# Patient Record
Sex: MALE | Race: WHITE | Employment: OTHER | URBAN - METROPOLITAN AREA
[De-identification: names, ages, dates, MRNs, and addresses within clinical notes are randomized per-mention and may not be internally consistent; named-entity substitution may affect disease eponyms.]

---

## 2017-01-01 ENCOUNTER — HOSPITAL ENCOUNTER (OUTPATIENT)
Dept: CT IMAGING | Age: 82
Discharge: HOME | End: 2017-01-01
Attending: FAMILY MEDICINE
Payer: MEDICARE

## 2017-01-01 ENCOUNTER — TELEPHONE (OUTPATIENT)
Dept: FAMILY MEDICINE CLINIC | Facility: CLINIC | Age: 82
End: 2017-01-01

## 2017-01-01 ENCOUNTER — RT VISIT (OUTPATIENT)
Dept: RESPIRATORY THERAPY | Facility: HOSPITAL | Age: 82
End: 2017-01-01
Attending: FAMILY MEDICINE
Payer: MEDICARE

## 2017-01-01 ENCOUNTER — HOSPITAL ENCOUNTER (OUTPATIENT)
Dept: MRI IMAGING | Facility: HOSPITAL | Age: 82
Discharge: HOME | End: 2017-01-01
Attending: INTERNAL MEDICINE
Payer: MEDICARE

## 2017-01-01 ENCOUNTER — OFFICE VISIT (OUTPATIENT)
Dept: FAMILY MEDICINE CLINIC | Facility: CLINIC | Age: 82
End: 2017-01-01

## 2017-01-01 ENCOUNTER — HOSPITAL ENCOUNTER (OUTPATIENT)
Facility: HOSPITAL | Age: 82
Setting detail: HOSPITAL OUTPATIENT SURGERY
Discharge: HOME | End: 2017-01-01
Attending: INTERNAL MEDICINE | Admitting: INTERNAL MEDICINE
Payer: MEDICARE

## 2017-01-01 ENCOUNTER — ANESTHESIA EVENT (OUTPATIENT)
Dept: ENDOSCOPY | Facility: HOSPITAL | Age: 82
End: 2017-01-01
Payer: MEDICARE

## 2017-01-01 ENCOUNTER — OFFICE VISIT (OUTPATIENT)
Dept: HEMATOLOGY/ONCOLOGY | Facility: HOSPITAL | Age: 82
End: 2017-01-01
Attending: INTERNAL MEDICINE
Payer: MEDICARE

## 2017-01-01 ENCOUNTER — HOSPITAL ENCOUNTER (OUTPATIENT)
Dept: ULTRASOUND IMAGING | Facility: HOSPITAL | Age: 82
Discharge: HOME | End: 2017-01-01
Attending: FAMILY MEDICINE
Payer: MEDICARE

## 2017-01-01 ENCOUNTER — LAB ENCOUNTER (OUTPATIENT)
Dept: LAB | Age: 82
End: 2017-01-01
Attending: FAMILY MEDICINE
Payer: MEDICARE

## 2017-01-01 ENCOUNTER — PATIENT OUTREACH (OUTPATIENT)
Dept: CASE MANAGEMENT | Age: 82
End: 2017-01-01

## 2017-01-01 ENCOUNTER — MED REC SCAN ONLY (OUTPATIENT)
Dept: FAMILY MEDICINE CLINIC | Facility: CLINIC | Age: 82
End: 2017-01-01

## 2017-01-01 ENCOUNTER — ANESTHESIA (OUTPATIENT)
Dept: ENDOSCOPY | Facility: HOSPITAL | Age: 82
End: 2017-01-01
Payer: MEDICARE

## 2017-01-01 ENCOUNTER — SURGERY (OUTPATIENT)
Age: 82
End: 2017-01-01

## 2017-01-01 ENCOUNTER — HOSPITAL ENCOUNTER (OUTPATIENT)
Dept: NUCLEAR MEDICINE | Facility: HOSPITAL | Age: 82
Discharge: HOME | End: 2017-01-01
Attending: INTERNAL MEDICINE
Payer: MEDICARE

## 2017-01-01 ENCOUNTER — HOSPITAL ENCOUNTER (OUTPATIENT)
Dept: ULTRASOUND IMAGING | Age: 82
Discharge: HOME | End: 2017-01-01
Attending: FAMILY MEDICINE
Payer: MEDICARE

## 2017-01-01 ENCOUNTER — HOSPITAL ENCOUNTER (OUTPATIENT)
Dept: GENERAL RADIOLOGY | Age: 82
Discharge: HOME | End: 2017-01-01
Attending: FAMILY MEDICINE
Payer: MEDICARE

## 2017-01-01 VITALS
TEMPERATURE: 98 F | OXYGEN SATURATION: 89 % | HEIGHT: 64 IN | RESPIRATION RATE: 18 BRPM | SYSTOLIC BLOOD PRESSURE: 148 MMHG | HEART RATE: 37 BPM | BODY MASS INDEX: 34.15 KG/M2 | WEIGHT: 200 LBS | DIASTOLIC BLOOD PRESSURE: 75 MMHG

## 2017-01-01 VITALS
TEMPERATURE: 98 F | RESPIRATION RATE: 20 BRPM | SYSTOLIC BLOOD PRESSURE: 126 MMHG | OXYGEN SATURATION: 98 % | BODY MASS INDEX: 34.49 KG/M2 | HEART RATE: 68 BPM | DIASTOLIC BLOOD PRESSURE: 72 MMHG | HEIGHT: 64 IN | WEIGHT: 202 LBS

## 2017-01-01 VITALS
SYSTOLIC BLOOD PRESSURE: 152 MMHG | DIASTOLIC BLOOD PRESSURE: 67 MMHG | WEIGHT: 203.63 LBS | HEIGHT: 64 IN | RESPIRATION RATE: 20 BRPM | HEART RATE: 77 BPM | BODY MASS INDEX: 34.76 KG/M2 | OXYGEN SATURATION: 94 %

## 2017-01-01 VITALS
BODY MASS INDEX: 34.49 KG/M2 | DIASTOLIC BLOOD PRESSURE: 58 MMHG | HEART RATE: 78 BPM | SYSTOLIC BLOOD PRESSURE: 130 MMHG | OXYGEN SATURATION: 96 % | WEIGHT: 202 LBS | HEIGHT: 64 IN | TEMPERATURE: 98 F | RESPIRATION RATE: 20 BRPM

## 2017-01-01 DIAGNOSIS — R06.00 DYSPNEA, UNSPECIFIED TYPE: ICD-10-CM

## 2017-01-01 DIAGNOSIS — E04.1 THYROID NODULE: ICD-10-CM

## 2017-01-01 DIAGNOSIS — I10 ESSENTIAL HYPERTENSION: ICD-10-CM

## 2017-01-01 DIAGNOSIS — C34.92 SMALL CELL CARCINOMA OF LEFT LUNG (HCC): Primary | ICD-10-CM

## 2017-01-01 DIAGNOSIS — R91.8 LUNG MASS: ICD-10-CM

## 2017-01-01 DIAGNOSIS — R91.1 LUNG NODULE: ICD-10-CM

## 2017-01-01 DIAGNOSIS — R63.5 WEIGHT GAIN: ICD-10-CM

## 2017-01-01 DIAGNOSIS — C34.92 SMALL CELL CARCINOMA OF LEFT LUNG (HCC): ICD-10-CM

## 2017-01-01 DIAGNOSIS — E04.1 THYROID NODULE: Primary | ICD-10-CM

## 2017-01-01 DIAGNOSIS — I10 BENIGN ESSENTIAL HTN: ICD-10-CM

## 2017-01-01 DIAGNOSIS — E78.5 DYSLIPIDEMIA: ICD-10-CM

## 2017-01-01 DIAGNOSIS — C34.90 SMALL CELL CARCINOMA OF LUNG, UNSPECIFIED LATERALITY: ICD-10-CM

## 2017-01-01 DIAGNOSIS — N18.30 CKD (CHRONIC KIDNEY DISEASE), STAGE III (HCC): ICD-10-CM

## 2017-01-01 DIAGNOSIS — R63.5 WEIGHT GAIN: Primary | ICD-10-CM

## 2017-01-01 DIAGNOSIS — E11.9 TYPE 2 DIABETES MELLITUS WITHOUT COMPLICATION, WITHOUT LONG-TERM CURRENT USE OF INSULIN (HCC): ICD-10-CM

## 2017-01-01 DIAGNOSIS — R91.8 LUNG MASS: Primary | ICD-10-CM

## 2017-01-01 DIAGNOSIS — C61 MALIGNANT NEOPLASM OF PROSTATE (HCC): ICD-10-CM

## 2017-01-01 DIAGNOSIS — I70.0 AORTIC ATHEROSCLEROSIS (HCC): ICD-10-CM

## 2017-01-01 PROCEDURE — 99205 OFFICE O/P NEW HI 60 MIN: CPT | Performed by: INTERNAL MEDICINE

## 2017-01-01 PROCEDURE — 94729 DIFFUSING CAPACITY: CPT

## 2017-01-01 PROCEDURE — 94060 EVALUATION OF WHEEZING: CPT

## 2017-01-01 PROCEDURE — 88173 CYTOPATH EVAL FNA REPORT: CPT | Performed by: FAMILY MEDICINE

## 2017-01-01 PROCEDURE — 94726 PLETHYSMOGRAPHY LUNG VOLUMES: CPT

## 2017-01-01 PROCEDURE — 99214 OFFICE O/P EST MOD 30 MIN: CPT | Performed by: FAMILY MEDICINE

## 2017-01-01 PROCEDURE — 93005 ELECTROCARDIOGRAM TRACING: CPT

## 2017-01-01 PROCEDURE — 07978ZX DRAINAGE OF THORAX LYMPHATIC, VIA NATURAL OR ARTIFICIAL OPENING ENDOSCOPIC APPROACH, DIAGNOSTIC: ICD-10-PCS | Performed by: INTERNAL MEDICINE

## 2017-01-01 PROCEDURE — 0B9H8ZX DRAINAGE OF LUNG LINGULA, VIA NATURAL OR ARTIFICIAL OPENING ENDOSCOPIC, DIAGNOSTIC: ICD-10-PCS | Performed by: INTERNAL MEDICINE

## 2017-01-01 PROCEDURE — 84481 FREE ASSAY (FT-3): CPT

## 2017-01-01 PROCEDURE — 36415 COLL VENOUS BLD VENIPUNCTURE: CPT

## 2017-01-01 PROCEDURE — 93010 ELECTROCARDIOGRAM REPORT: CPT | Performed by: INTERNAL MEDICINE

## 2017-01-01 PROCEDURE — 80053 COMPREHEN METABOLIC PANEL: CPT

## 2017-01-01 PROCEDURE — 10022 US FNA THYROID (CPT=10022/76942): CPT | Performed by: FAMILY MEDICINE

## 2017-01-01 PROCEDURE — 84443 ASSAY THYROID STIM HORMONE: CPT

## 2017-01-01 PROCEDURE — A9575 INJ GADOTERATE MEGLUMI 0.1ML: HCPCS | Performed by: INTERNAL MEDICINE

## 2017-01-01 PROCEDURE — 88341 IMHCHEM/IMCYTCHM EA ADD ANTB: CPT

## 2017-01-01 PROCEDURE — 76942 ECHO GUIDE FOR BIOPSY: CPT | Performed by: FAMILY MEDICINE

## 2017-01-01 PROCEDURE — 88342 IMHCHEM/IMCYTCHM 1ST ANTB: CPT

## 2017-01-01 PROCEDURE — 82962 GLUCOSE BLOOD TEST: CPT

## 2017-01-01 PROCEDURE — 78815 PET IMAGE W/CT SKULL-THIGH: CPT | Performed by: INTERNAL MEDICINE

## 2017-01-01 PROCEDURE — 88173 CYTOPATH EVAL FNA REPORT: CPT

## 2017-01-01 PROCEDURE — 84439 ASSAY OF FREE THYROXINE: CPT

## 2017-01-01 PROCEDURE — 71250 CT THORAX DX C-: CPT | Performed by: FAMILY MEDICINE

## 2017-01-01 PROCEDURE — 71020 XR CHEST PA + LAT CHEST (CPT=71020): CPT | Performed by: FAMILY MEDICINE

## 2017-01-01 PROCEDURE — 76536 US EXAM OF HEAD AND NECK: CPT | Performed by: FAMILY MEDICINE

## 2017-01-01 PROCEDURE — 83036 HEMOGLOBIN GLYCOSYLATED A1C: CPT

## 2017-01-01 PROCEDURE — 99490 CHRNC CARE MGMT STAFF 1ST 20: CPT

## 2017-01-01 PROCEDURE — 88305 TISSUE EXAM BY PATHOLOGIST: CPT

## 2017-01-01 PROCEDURE — 88104 CYTOPATH FL NONGYN SMEARS: CPT

## 2017-01-01 PROCEDURE — 70553 MRI BRAIN STEM W/O & W/DYE: CPT | Performed by: INTERNAL MEDICINE

## 2017-01-01 PROCEDURE — 88172 CYTP DX EVAL FNA 1ST EA SITE: CPT

## 2017-01-01 RX ORDER — MIDAZOLAM HYDROCHLORIDE 1 MG/ML
1 INJECTION INTRAMUSCULAR; INTRAVENOUS EVERY 5 MIN PRN
Status: DISCONTINUED | OUTPATIENT
Start: 2017-01-01 | End: 2017-01-01 | Stop reason: HOSPADM

## 2017-01-01 RX ORDER — DIPHENHYDRAMINE HYDROCHLORIDE 50 MG/ML
12.5 INJECTION INTRAMUSCULAR; INTRAVENOUS AS NEEDED
Status: DISCONTINUED | OUTPATIENT
Start: 2017-01-01 | End: 2017-01-01 | Stop reason: HOSPADM

## 2017-01-01 RX ORDER — HYDROMORPHONE HYDROCHLORIDE 1 MG/ML
0.5 INJECTION, SOLUTION INTRAMUSCULAR; INTRAVENOUS; SUBCUTANEOUS EVERY 5 MIN PRN
Status: DISCONTINUED | OUTPATIENT
Start: 2017-01-01 | End: 2017-01-01 | Stop reason: HOSPADM

## 2017-01-01 RX ORDER — ONDANSETRON 2 MG/ML
4 INJECTION INTRAMUSCULAR; INTRAVENOUS AS NEEDED
Status: DISCONTINUED | OUTPATIENT
Start: 2017-01-01 | End: 2017-01-01 | Stop reason: HOSPADM

## 2017-01-01 RX ORDER — AMLODIPINE BESYLATE 5 MG/1
TABLET ORAL
Qty: 90 TABLET | Refills: 3 | Status: SHIPPED | OUTPATIENT
Start: 2017-01-01 | End: 2018-01-01

## 2017-01-01 RX ORDER — METOCLOPRAMIDE HYDROCHLORIDE 5 MG/ML
10 INJECTION INTRAMUSCULAR; INTRAVENOUS AS NEEDED
Status: DISCONTINUED | OUTPATIENT
Start: 2017-01-01 | End: 2017-01-01 | Stop reason: HOSPADM

## 2017-01-01 RX ORDER — DEXTROSE MONOHYDRATE 25 G/50ML
50 INJECTION, SOLUTION INTRAVENOUS
Status: DISCONTINUED | OUTPATIENT
Start: 2017-01-01 | End: 2017-01-01 | Stop reason: HOSPADM

## 2017-01-01 RX ORDER — ALBUTEROL SULFATE 2.5 MG/3ML
2.5 SOLUTION RESPIRATORY (INHALATION) AS NEEDED
Status: DISCONTINUED | OUTPATIENT
Start: 2017-01-01 | End: 2017-01-01 | Stop reason: HOSPADM

## 2017-01-01 RX ORDER — HYDROCODONE BITARTRATE AND ACETAMINOPHEN 5; 325 MG/1; MG/1
1 TABLET ORAL AS NEEDED
Status: DISCONTINUED | OUTPATIENT
Start: 2017-01-01 | End: 2017-01-01 | Stop reason: HOSPADM

## 2017-01-01 RX ORDER — ALPRAZOLAM 0.25 MG/1
0.25 TABLET ORAL ONCE
Qty: 2 TABLET | Refills: 0 | Status: SHIPPED | OUTPATIENT
Start: 2017-01-01 | End: 2017-01-01

## 2017-01-01 RX ORDER — MEPERIDINE HYDROCHLORIDE 25 MG/ML
12.5 INJECTION INTRAMUSCULAR; INTRAVENOUS; SUBCUTANEOUS AS NEEDED
Status: DISCONTINUED | OUTPATIENT
Start: 2017-01-01 | End: 2017-01-01 | Stop reason: HOSPADM

## 2017-01-01 RX ORDER — ONDANSETRON 2 MG/ML
INJECTION INTRAMUSCULAR; INTRAVENOUS
Status: COMPLETED
Start: 2017-01-01 | End: 2017-01-01

## 2017-01-01 RX ORDER — HYDROMORPHONE HYDROCHLORIDE 1 MG/ML
INJECTION, SOLUTION INTRAMUSCULAR; INTRAVENOUS; SUBCUTANEOUS
Status: COMPLETED
Start: 2017-01-01 | End: 2017-01-01

## 2017-01-01 RX ORDER — SODIUM CHLORIDE, SODIUM LACTATE, POTASSIUM CHLORIDE, CALCIUM CHLORIDE 600; 310; 30; 20 MG/100ML; MG/100ML; MG/100ML; MG/100ML
INJECTION, SOLUTION INTRAVENOUS CONTINUOUS
Status: DISCONTINUED | OUTPATIENT
Start: 2017-01-01 | End: 2017-01-01

## 2017-01-01 RX ORDER — SODIUM CHLORIDE, SODIUM LACTATE, POTASSIUM CHLORIDE, CALCIUM CHLORIDE 600; 310; 30; 20 MG/100ML; MG/100ML; MG/100ML; MG/100ML
INJECTION, SOLUTION INTRAVENOUS CONTINUOUS
Status: DISCONTINUED | OUTPATIENT
Start: 2017-01-01 | End: 2017-01-01 | Stop reason: HOSPADM

## 2017-01-01 RX ORDER — NALOXONE HYDROCHLORIDE 0.4 MG/ML
80 INJECTION, SOLUTION INTRAMUSCULAR; INTRAVENOUS; SUBCUTANEOUS AS NEEDED
Status: DISCONTINUED | OUTPATIENT
Start: 2017-01-01 | End: 2017-01-01 | Stop reason: HOSPADM

## 2017-01-01 RX ORDER — HYDROCODONE BITARTRATE AND ACETAMINOPHEN 5; 325 MG/1; MG/1
2 TABLET ORAL AS NEEDED
Status: DISCONTINUED | OUTPATIENT
Start: 2017-01-01 | End: 2017-01-01 | Stop reason: HOSPADM

## 2017-01-01 RX ORDER — IBUPROFEN 200 MG
TABLET ORAL
COMMUNITY
Start: 2017-11-12 | End: 2018-01-01

## 2017-01-03 RX ORDER — ALLOPURINOL 300 MG/1
TABLET ORAL
Qty: 30 TABLET | Refills: 0 | Status: SHIPPED | OUTPATIENT
Start: 2017-01-03 | End: 2017-01-25

## 2017-01-03 NOTE — TELEPHONE ENCOUNTER
Requesting Allopurinol. Filled 12/1/16 #30 with 0 refills. LOV: 10/26/16 RTC after seeing Pulmonology.

## 2017-01-19 RX ORDER — QUINAPRIL HCL AND HYDROCHLOROTHIAZIDE 10; 12.5 MG/1; MG/1
1 TABLET ORAL DAILY
COMMUNITY
End: 2017-01-25

## 2017-01-25 ENCOUNTER — TELEPHONE (OUTPATIENT)
Dept: FAMILY MEDICINE CLINIC | Facility: CLINIC | Age: 82
End: 2017-01-25

## 2017-01-25 ENCOUNTER — OFFICE VISIT (OUTPATIENT)
Dept: FAMILY MEDICINE CLINIC | Facility: CLINIC | Age: 82
End: 2017-01-25

## 2017-01-25 VITALS
RESPIRATION RATE: 16 BRPM | HEIGHT: 64 IN | SYSTOLIC BLOOD PRESSURE: 152 MMHG | HEART RATE: 78 BPM | DIASTOLIC BLOOD PRESSURE: 88 MMHG | WEIGHT: 175 LBS | BODY MASS INDEX: 29.88 KG/M2 | TEMPERATURE: 98 F

## 2017-01-25 DIAGNOSIS — M1A.9XX0 CHRONIC GOUT WITHOUT TOPHUS, UNSPECIFIED CAUSE, UNSPECIFIED SITE: ICD-10-CM

## 2017-01-25 DIAGNOSIS — E11.9 TYPE 2 DIABETES MELLITUS WITHOUT COMPLICATION, WITHOUT LONG-TERM CURRENT USE OF INSULIN (HCC): Primary | ICD-10-CM

## 2017-01-25 DIAGNOSIS — L29.3 ITCHING OF MALE GENITALIA: ICD-10-CM

## 2017-01-25 DIAGNOSIS — C61 MALIGNANT NEOPLASM OF PROSTATE (HCC): ICD-10-CM

## 2017-01-25 DIAGNOSIS — I10 ESSENTIAL HYPERTENSION: ICD-10-CM

## 2017-01-25 DIAGNOSIS — J31.0 CHRONIC RHINITIS: ICD-10-CM

## 2017-01-25 PROCEDURE — 99214 OFFICE O/P EST MOD 30 MIN: CPT | Performed by: FAMILY MEDICINE

## 2017-01-25 RX ORDER — AZELASTINE 1 MG/ML
1 SPRAY, METERED NASAL 2 TIMES DAILY
Qty: 1 BOTTLE | Refills: 5 | Status: SHIPPED | OUTPATIENT
Start: 2017-01-25 | End: 2018-01-01

## 2017-01-25 RX ORDER — COLCHICINE 0.6 MG/1
0.6 TABLET ORAL DAILY
Qty: 90 TABLET | Refills: 1 | Status: SHIPPED | OUTPATIENT
Start: 2017-01-25 | End: 2017-09-06 | Stop reason: ALTCHOICE

## 2017-01-25 RX ORDER — QUINAPRIL HCL AND HYDROCHLOROTHIAZIDE 20; 12.5 MG/1; MG/1
1 TABLET ORAL DAILY
Qty: 90 TABLET | Refills: 1 | Status: SHIPPED | OUTPATIENT
Start: 2017-01-25 | End: 2017-04-25

## 2017-01-25 RX ORDER — ALLOPURINOL 300 MG/1
TABLET ORAL
Qty: 90 TABLET | Refills: 1 | Status: ON HOLD | OUTPATIENT
Start: 2017-01-25 | End: 2017-04-11

## 2017-01-25 NOTE — PROGRESS NOTES
HPI:    Patient ID: Patrica Apley is a 80year old male. HPI Comments: Hx of prostate cancer. Considered to be in remission. Has not had PSA done in > 1 yr. No new symptoms. No bone pain. No unexpected weight loss.     HTN  This is a chronic pro Gets it in the toes b/l worse on left side. Redness, swelling, and tenderness. . Nothing aggravates the symptoms. Treatments tried: allopurinol, colchicine. The treatment provided mild relief. Rhinorrhea  This is a chronic problem.  The current episode st mouth daily. Disp: 30 tablet Rfl: 6   MetFORMIN HCl 500 MG Oral Tab Take 500 mg by mouth 2 (two) times daily with meals. Disp:  Rfl:    cetirizine 10 MG Oral Tab Take 10 mg by mouth daily as needed.    Disp:  Rfl:    omeprazole 20 MG Oral Capsule Delayed Re Soft. Bowel sounds are normal. He exhibits no distension. There is no tenderness. Genitourinary: Penis normal. No penile tenderness. Genital area with red rash on groin area lke eczema. Lymphadenopathy:     He has no cervical adenopathy.    Skin: He i Dispense: 60 g; Refill: 3    6. Malignant neoplasm of prostate ([address]  - PSA (Screening) [E];  Future      Orders Placed This Encounter  Hemoglobin A1C [E]  Uric Acid, Serum [E]  PSA (Screening) [E]  Microalb/Creat Ratio, Random Urine [E]    Meds This Visit:

## 2017-01-26 ENCOUNTER — LAB ENCOUNTER (OUTPATIENT)
Dept: LAB | Age: 82
End: 2017-01-26
Payer: MEDICARE

## 2017-01-26 ENCOUNTER — APPOINTMENT (OUTPATIENT)
Dept: LAB | Age: 82
End: 2017-01-26
Payer: MEDICARE

## 2017-01-26 DIAGNOSIS — C61 MALIGNANT NEOPLASM OF PROSTATE (HCC): ICD-10-CM

## 2017-01-26 DIAGNOSIS — M1A.9XX0 CHRONIC GOUT WITHOUT TOPHUS, UNSPECIFIED CAUSE, UNSPECIFIED SITE: ICD-10-CM

## 2017-01-26 DIAGNOSIS — Z86.010 HISTORY OF COLON POLYPS: ICD-10-CM

## 2017-01-26 DIAGNOSIS — K21.9 ESOPHAGEAL REFLUX: ICD-10-CM

## 2017-01-26 DIAGNOSIS — E11.9 TYPE 2 DIABETES MELLITUS WITHOUT COMPLICATION, WITHOUT LONG-TERM CURRENT USE OF INSULIN (HCC): ICD-10-CM

## 2017-01-26 DIAGNOSIS — I10 ESSENTIAL HYPERTENSION: ICD-10-CM

## 2017-01-26 LAB
ALBUMIN SERPL-MCNC: 3.5 G/DL (ref 3.5–4.8)
ALP LIVER SERPL-CCNC: 91 U/L (ref 45–117)
ALT SERPL-CCNC: 22 U/L (ref 17–63)
AST SERPL-CCNC: 23 U/L (ref 15–41)
ATRIAL RATE: 65 BPM
ATRIAL RATE: 66 BPM
BASOPHILS # BLD AUTO: 0.03 X10(3) UL (ref 0–0.1)
BASOPHILS NFR BLD AUTO: 0.4 %
BILIRUB SERPL-MCNC: 0.3 MG/DL (ref 0.1–2)
BUN BLD-MCNC: 26 MG/DL (ref 8–20)
CALCIUM BLD-MCNC: 9.6 MG/DL (ref 8.3–10.3)
CHLORIDE: 103 MMOL/L (ref 101–111)
CHOLEST SMN-MCNC: 144 MG/DL (ref ?–200)
CO2: 27 MMOL/L (ref 22–32)
COMPLEXED PSA SERPL-MCNC: 0.66 NG/ML (ref 0.01–4)
CREAT BLD-MCNC: 1.26 MG/DL (ref 0.7–1.3)
CREAT UR-SCNC: 151 MG/DL
EOSINOPHIL # BLD AUTO: 0.2 X10(3) UL (ref 0–0.3)
EOSINOPHIL NFR BLD AUTO: 2.8 %
ERYTHROCYTE [DISTWIDTH] IN BLOOD BY AUTOMATED COUNT: 14.6 % (ref 11.5–16)
EST. AVERAGE GLUCOSE BLD GHB EST-MCNC: 126 MG/DL (ref 68–126)
GLUCOSE BLD-MCNC: 108 MG/DL (ref 70–99)
HBA1C MFR BLD HPLC: 6 % (ref ?–5.7)
HCT VFR BLD AUTO: 38.5 % (ref 37–53)
HDLC SERPL-MCNC: 47 MG/DL (ref 45–?)
HDLC SERPL: 3.06 {RATIO} (ref ?–4.97)
HGB BLD-MCNC: 12.5 G/DL (ref 13–17)
IMMATURE GRANULOCYTE COUNT: 0.04 X10(3) UL (ref 0–1)
IMMATURE GRANULOCYTE RATIO %: 0.6 %
LDLC SERPL CALC-MCNC: 69 MG/DL (ref ?–130)
LYMPHOCYTES # BLD AUTO: 1.88 X10(3) UL (ref 0.9–4)
LYMPHOCYTES NFR BLD AUTO: 26.1 %
M PROTEIN MFR SERPL ELPH: 7.8 G/DL (ref 6.1–8.3)
MCH RBC QN AUTO: 30.6 PG (ref 27–33.2)
MCHC RBC AUTO-ENTMCNC: 32.5 G/DL (ref 31–37)
MCV RBC AUTO: 94.1 FL (ref 80–99)
MICROALBUMIN UR-MCNC: 162 MG/DL
MICROALBUMIN/CREAT 24H UR-RTO: 1072.8 UG/MG (ref ?–30)
MONOCYTES # BLD AUTO: 0.44 X10(3) UL (ref 0.1–0.6)
MONOCYTES NFR BLD AUTO: 6.1 %
NEUTROPHIL ABS PRELIM: 4.62 X10 (3) UL (ref 1.3–6.7)
NEUTROPHILS # BLD AUTO: 4.62 X10(3) UL (ref 1.3–6.7)
NEUTROPHILS NFR BLD AUTO: 64 %
NONHDLC SERPL-MCNC: 97 MG/DL (ref ?–130)
P AXIS: 32 DEGREES
P AXIS: 38 DEGREES
P-R INTERVAL: 202 MS
P-R INTERVAL: 202 MS
PLATELET # BLD AUTO: 193 10(3)UL (ref 150–450)
POTASSIUM SERPL-SCNC: 4.7 MMOL/L (ref 3.6–5.1)
Q-T INTERVAL: 424 MS
Q-T INTERVAL: 426 MS
QRS DURATION: 100 MS
QRS DURATION: 102 MS
QTC CALCULATION (BEZET): 440 MS
QTC CALCULATION (BEZET): 446 MS
R AXIS: -14 DEGREES
R AXIS: -15 DEGREES
RBC # BLD AUTO: 4.09 X10(6)UL (ref 3.8–5.8)
RED CELL DISTRIBUTION WIDTH-SD: 50.6 FL (ref 35.1–46.3)
SODIUM SERPL-SCNC: 139 MMOL/L (ref 136–144)
T AXIS: 28 DEGREES
T AXIS: 38 DEGREES
TRIGLYCERIDES: 141 MG/DL (ref ?–150)
URIC ACID: 4.9 MG/DL (ref 2.4–8.7)
VENTRICULAR RATE: 65 BPM
VENTRICULAR RATE: 66 BPM
VLDL: 28 MG/DL (ref 5–40)
WBC # BLD AUTO: 7.2 X10(3) UL (ref 4–13)

## 2017-01-26 PROCEDURE — 80061 LIPID PANEL: CPT

## 2017-01-26 PROCEDURE — 83036 HEMOGLOBIN GLYCOSYLATED A1C: CPT

## 2017-01-26 PROCEDURE — 84550 ASSAY OF BLOOD/URIC ACID: CPT

## 2017-01-26 PROCEDURE — 93005 ELECTROCARDIOGRAM TRACING: CPT

## 2017-01-26 PROCEDURE — 85025 COMPLETE CBC W/AUTO DIFF WBC: CPT

## 2017-01-26 PROCEDURE — 82043 UR ALBUMIN QUANTITATIVE: CPT

## 2017-01-26 PROCEDURE — 82570 ASSAY OF URINE CREATININE: CPT

## 2017-01-26 PROCEDURE — 93010 ELECTROCARDIOGRAM REPORT: CPT | Performed by: INTERNAL MEDICINE

## 2017-01-26 PROCEDURE — 80053 COMPREHEN METABOLIC PANEL: CPT

## 2017-01-26 PROCEDURE — 36415 COLL VENOUS BLD VENIPUNCTURE: CPT

## 2017-01-27 ENCOUNTER — TELEPHONE (OUTPATIENT)
Dept: FAMILY MEDICINE CLINIC | Facility: CLINIC | Age: 82
End: 2017-01-27

## 2017-02-08 ENCOUNTER — OFFICE VISIT (OUTPATIENT)
Dept: FAMILY MEDICINE CLINIC | Facility: CLINIC | Age: 82
End: 2017-02-08

## 2017-02-08 VITALS
DIASTOLIC BLOOD PRESSURE: 76 MMHG | HEIGHT: 64 IN | BODY MASS INDEX: 32.1 KG/M2 | SYSTOLIC BLOOD PRESSURE: 122 MMHG | WEIGHT: 188 LBS | RESPIRATION RATE: 16 BRPM | HEART RATE: 80 BPM

## 2017-02-08 DIAGNOSIS — M21.612 BUNION OF GREAT TOE OF LEFT FOOT: ICD-10-CM

## 2017-02-08 DIAGNOSIS — B35.6 JOCK ITCH: ICD-10-CM

## 2017-02-08 DIAGNOSIS — E04.9 ENLARGED THYROID: ICD-10-CM

## 2017-02-08 DIAGNOSIS — N18.30 CKD (CHRONIC KIDNEY DISEASE), STAGE III (HCC): ICD-10-CM

## 2017-02-08 DIAGNOSIS — I10 ESSENTIAL HYPERTENSION: Primary | ICD-10-CM

## 2017-02-08 DIAGNOSIS — M1A.9XX0 CHRONIC GOUT INVOLVING TOE OF LEFT FOOT WITHOUT TOPHUS, UNSPECIFIED CAUSE: ICD-10-CM

## 2017-02-08 PROCEDURE — 99214 OFFICE O/P EST MOD 30 MIN: CPT | Performed by: FAMILY MEDICINE

## 2017-02-08 RX ORDER — KETOCONAZOLE 20 MG/G
1 CREAM TOPICAL DAILY
Qty: 60 TUBE | Refills: 1 | Status: SHIPPED | OUTPATIENT
Start: 2017-02-08 | End: 2017-03-27 | Stop reason: ALTCHOICE

## 2017-02-09 NOTE — PROGRESS NOTES
HPI:    Patient ID: Peace Adamson is a 80year old male. HPI Comments: Has hx of enlarged thyroid from imaging in the past and thyroid nodules. HTN  This is a chronic problem. The current episode started more than 1 year ago.  The problem has been are negative. Current Outpatient Prescriptions:  ketoconazole 2 % External Cream Apply 1 Application topically daily. Disp: 60 Tube Rfl: 1   Quinapril-Hydrochlorothiazide 20-12.5 MG Oral Tab Take 1 tablet by mouth daily.  Disp: 90 tablet Rfl: 1 discharge. Left eye exhibits no discharge. No scleral icterus. Neck: Normal range of motion. Neck supple. Thyromegaly present. Cardiovascular: Normal rate, regular rhythm and normal heart sounds.     Pulmonary/Chest: Effort normal and breath sounds norm daily.           Imaging & Referrals:  US THYROID (NRF=[id number])       AJ#[id number]

## 2017-02-10 ENCOUNTER — HOSPITAL ENCOUNTER (OUTPATIENT)
Dept: ULTRASOUND IMAGING | Age: 82
Discharge: HOME | End: 2017-02-10
Attending: FAMILY MEDICINE
Payer: MEDICARE

## 2017-02-10 ENCOUNTER — APPOINTMENT (OUTPATIENT)
Dept: LAB | Age: 82
End: 2017-02-10
Attending: FAMILY MEDICINE
Payer: MEDICARE

## 2017-02-10 DIAGNOSIS — E04.9 ENLARGED THYROID: ICD-10-CM

## 2017-02-10 LAB
FREE T4: 1.1 NG/DL (ref 0.9–1.8)
TSI SER-ACNC: 1.21 MIU/ML (ref 0.35–5.5)

## 2017-02-10 PROCEDURE — 76536 US EXAM OF HEAD AND NECK: CPT

## 2017-02-10 PROCEDURE — 84439 ASSAY OF FREE THYROXINE: CPT

## 2017-02-10 PROCEDURE — 84443 ASSAY THYROID STIM HORMONE: CPT

## 2017-02-10 PROCEDURE — 36415 COLL VENOUS BLD VENIPUNCTURE: CPT

## 2017-02-21 ENCOUNTER — HOSPITAL ENCOUNTER (OUTPATIENT)
Facility: HOSPITAL | Age: 82
Setting detail: HOSPITAL OUTPATIENT SURGERY
Discharge: HOME | End: 2017-02-21
Attending: INTERNAL MEDICINE | Admitting: INTERNAL MEDICINE
Payer: MEDICARE

## 2017-02-21 ENCOUNTER — SURGERY (OUTPATIENT)
Age: 82
End: 2017-02-21

## 2017-02-21 ENCOUNTER — ANESTHESIA (OUTPATIENT)
Dept: ENDOSCOPY | Facility: HOSPITAL | Age: 82
End: 2017-02-21

## 2017-02-21 ENCOUNTER — ANESTHESIA EVENT (OUTPATIENT)
Dept: ENDOSCOPY | Facility: HOSPITAL | Age: 82
End: 2017-02-21

## 2017-02-21 VITALS
OXYGEN SATURATION: 98 % | RESPIRATION RATE: 16 BRPM | SYSTOLIC BLOOD PRESSURE: 160 MMHG | TEMPERATURE: 98 F | HEIGHT: 64 IN | BODY MASS INDEX: 30.73 KG/M2 | HEART RATE: 84 BPM | WEIGHT: 180 LBS | DIASTOLIC BLOOD PRESSURE: 66 MMHG

## 2017-02-21 DIAGNOSIS — K21.9 ESOPHAGEAL REFLUX: ICD-10-CM

## 2017-02-21 DIAGNOSIS — Z86.010 HISTORY OF COLON POLYPS: Primary | ICD-10-CM

## 2017-02-21 LAB — GLUCOSE BLD-MCNC: 99 MG/DL (ref 65–99)

## 2017-02-21 PROCEDURE — 88305 TISSUE EXAM BY PATHOLOGIST: CPT | Performed by: INTERNAL MEDICINE

## 2017-02-21 PROCEDURE — 0DB18ZX EXCISION OF UPPER ESOPHAGUS, VIA NATURAL OR ARTIFICIAL OPENING ENDOSCOPIC, DIAGNOSTIC: ICD-10-PCS | Performed by: INTERNAL MEDICINE

## 2017-02-21 PROCEDURE — 0DB38ZX EXCISION OF LOWER ESOPHAGUS, VIA NATURAL OR ARTIFICIAL OPENING ENDOSCOPIC, DIAGNOSTIC: ICD-10-PCS | Performed by: INTERNAL MEDICINE

## 2017-02-21 PROCEDURE — 0DBL8ZX EXCISION OF TRANSVERSE COLON, VIA NATURAL OR ARTIFICIAL OPENING ENDOSCOPIC, DIAGNOSTIC: ICD-10-PCS | Performed by: INTERNAL MEDICINE

## 2017-02-21 PROCEDURE — 82962 GLUCOSE BLOOD TEST: CPT

## 2017-02-21 PROCEDURE — 0DB68ZX EXCISION OF STOMACH, VIA NATURAL OR ARTIFICIAL OPENING ENDOSCOPIC, DIAGNOSTIC: ICD-10-PCS | Performed by: INTERNAL MEDICINE

## 2017-02-21 RX ORDER — DEXTROSE MONOHYDRATE 25 G/50ML
50 INJECTION, SOLUTION INTRAVENOUS
Status: DISCONTINUED | OUTPATIENT
Start: 2017-02-21 | End: 2017-02-21

## 2017-02-21 RX ORDER — ONDANSETRON 2 MG/ML
4 INJECTION INTRAMUSCULAR; INTRAVENOUS AS NEEDED
Status: DISCONTINUED | OUTPATIENT
Start: 2017-02-21 | End: 2017-02-21

## 2017-02-21 RX ORDER — SODIUM CHLORIDE, SODIUM LACTATE, POTASSIUM CHLORIDE, CALCIUM CHLORIDE 600; 310; 30; 20 MG/100ML; MG/100ML; MG/100ML; MG/100ML
INJECTION, SOLUTION INTRAVENOUS CONTINUOUS
Status: DISCONTINUED | OUTPATIENT
Start: 2017-02-21 | End: 2017-02-21

## 2017-02-21 RX ORDER — HYDROMORPHONE HYDROCHLORIDE 1 MG/ML
0.4 INJECTION, SOLUTION INTRAMUSCULAR; INTRAVENOUS; SUBCUTANEOUS EVERY 5 MIN PRN
Status: DISCONTINUED | OUTPATIENT
Start: 2017-02-21 | End: 2017-02-21

## 2017-02-21 RX ORDER — DEXAMETHASONE SODIUM PHOSPHATE 4 MG/ML
4 VIAL (ML) INJECTION AS NEEDED
Status: DISCONTINUED | OUTPATIENT
Start: 2017-02-21 | End: 2017-02-21

## 2017-02-21 RX ORDER — NALOXONE HYDROCHLORIDE 0.4 MG/ML
80 INJECTION, SOLUTION INTRAMUSCULAR; INTRAVENOUS; SUBCUTANEOUS AS NEEDED
Status: DISCONTINUED | OUTPATIENT
Start: 2017-02-21 | End: 2017-02-21

## 2017-02-21 NOTE — H&P
Kiel Gongora  Patient Status:  Hospital Outpatient Surgery    3/9/1935 MRN [id number]   [facility] ENDOSCOPY Attending Chriss White MD   [facility] Day #  PCP Josseline Mirza DO     History of 1 drop into both eyes 2 (two) times daily as needed.  ), Disp: 2 Bottle, Rfl: 6  •  Pyridoxine HCl (VITAMIN B6 OR), Take  by mouth., Disp: , Rfl:   •  Cyanocobalamin (VITAMIN B-12) 2000 MCG Oral Tab CR, Take  by mouth., Disp: , Rfl:   •  Aspirin 81 MG Oral recent dizziness, convulsions/seizures, dementia.   Cardiovascular: Denies history of heart murmur, leg swelling, history of rheumatic fever, pacemaker, chest pain or pressure after eating or when upset, automatic defibrillator, angina, other implanted willard noted.   Neck: Soft, supple neck; trachea midline, no adenopathy, no thyromegaly. Lungs: CTA B   Chest wall: No tenderness or deformity. Heart: Regular rate and rhythm, normal S1S2, no murmur.    Abdomen: soft, non-tender, non-distended, no masses, no g

## 2017-02-21 NOTE — OPERATIVE REPORT
EGD operative report  Patient Name: Patrica Apley  Procedure: Esophagogastroduodenoscopy with biopsy   Indication: Chronic heartburn  Attending: Calixto Wade M.D.   Consent:  The risks, benefits, and alternatives were discussed with the patient / P duodenum, and descending duodenum were normal, without masses, polyps, ulcers, erosions, diverticula, or varices. Impression: Findings as above  Recommendations:  Follow-up pathology            Continue daily PPI for heartburn management            Avoid

## 2017-02-21 NOTE — ANESTHESIA PREPROCEDURE EVALUATION
PRE-OP EVALUATION    Patient Name: Chino Alvarez    Pre-op Diagnosis: HISTORY OF COLON POLYPS, GERD W/O ESOPHAGITIS    Procedure(s):  ESOPHAGOGASTRODUODENOSCOPY AND COLONOSCOPY      Surgeon(s) and Role:     * Low Maza MD - Primary    Pre-op vi 0.15 % Nasal Solution 2 sprays by Nasal route daily. Disp: 3 each Rfl: 6   [DISCONTINUED] colchicine (COLCRYS) 0.6 MG Oral Tab Take 1 tablet by mouth daily.  (Patient taking differently: Take 0.6 mg by mouth daily as needed.  ) Disp: 6 tablet Rfl: 6   Pyrid cm  Neck ROM: full Cardiovascular      Rhythm: regular  Rate: normal     Dental             Pulmonary      Breath sounds clear to auscultation bilaterally.                Other findings            ASA: 3   Plan: MAC  NPO status verified and patient meets gu

## 2017-03-08 ENCOUNTER — HOSPITAL ENCOUNTER (OUTPATIENT)
Dept: ULTRASOUND IMAGING | Facility: HOSPITAL | Age: 82
Discharge: HOME | End: 2017-03-08
Attending: OTOLARYNGOLOGY
Payer: MEDICARE

## 2017-03-08 DIAGNOSIS — E04.2 NONTOXIC MULTINODULAR GOITER: ICD-10-CM

## 2017-03-08 PROCEDURE — 88173 CYTOPATH EVAL FNA REPORT: CPT | Performed by: OTOLARYNGOLOGY

## 2017-03-08 PROCEDURE — 76942 ECHO GUIDE FOR BIOPSY: CPT

## 2017-03-08 PROCEDURE — 10022 US FNA THYROID SH(CPT=10022/76942): CPT

## 2017-03-09 ENCOUNTER — TELEPHONE (OUTPATIENT)
Dept: FAMILY MEDICINE CLINIC | Facility: CLINIC | Age: 82
End: 2017-03-09

## 2017-03-09 NOTE — PROCEDURES
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

## 2017-03-10 ENCOUNTER — TELEPHONE (OUTPATIENT)
Dept: FAMILY MEDICINE CLINIC | Facility: CLINIC | Age: 82
End: 2017-03-10

## 2017-03-13 ENCOUNTER — OFFICE VISIT (OUTPATIENT)
Dept: FAMILY MEDICINE CLINIC | Facility: CLINIC | Age: 82
End: 2017-03-13

## 2017-03-13 ENCOUNTER — TELEPHONE (OUTPATIENT)
Dept: FAMILY MEDICINE CLINIC | Facility: CLINIC | Age: 82
End: 2017-03-13

## 2017-03-13 ENCOUNTER — LAB ENCOUNTER (OUTPATIENT)
Dept: LAB | Age: 82
End: 2017-03-13
Attending: FAMILY MEDICINE
Payer: MEDICARE

## 2017-03-13 ENCOUNTER — EKG ENCOUNTER (OUTPATIENT)
Dept: LAB | Age: 82
End: 2017-03-13
Attending: FAMILY MEDICINE
Payer: MEDICARE

## 2017-03-13 VITALS
HEART RATE: 70 BPM | BODY MASS INDEX: 31.58 KG/M2 | OXYGEN SATURATION: 96 % | RESPIRATION RATE: 18 BRPM | DIASTOLIC BLOOD PRESSURE: 70 MMHG | HEIGHT: 64 IN | WEIGHT: 185 LBS | SYSTOLIC BLOOD PRESSURE: 158 MMHG | TEMPERATURE: 98 F

## 2017-03-13 DIAGNOSIS — M51.16 LUMBAR DISC DISEASE WITH RADICULOPATHY: ICD-10-CM

## 2017-03-13 DIAGNOSIS — E11.9 TYPE 2 DIABETES MELLITUS WITHOUT COMPLICATION, WITHOUT LONG-TERM CURRENT USE OF INSULIN (HCC): ICD-10-CM

## 2017-03-13 DIAGNOSIS — Z01.818 PREOP EXAMINATION: ICD-10-CM

## 2017-03-13 DIAGNOSIS — I71.4 ABDOMINAL AORTIC ANEURYSM (AAA) WITHOUT RUPTURE (HCC): ICD-10-CM

## 2017-03-13 DIAGNOSIS — Z01.818 PREOP EXAMINATION: Primary | ICD-10-CM

## 2017-03-13 DIAGNOSIS — R06.00 DYSPNEA ON EXERTION: ICD-10-CM

## 2017-03-13 DIAGNOSIS — I10 ESSENTIAL HYPERTENSION: ICD-10-CM

## 2017-03-13 DIAGNOSIS — C61 MALIGNANT NEOPLASM OF PROSTATE (HCC): ICD-10-CM

## 2017-03-13 DIAGNOSIS — G47.33 OBSTRUCTIVE SLEEP APNEA (ADULT) (PEDIATRIC): ICD-10-CM

## 2017-03-13 LAB
ALBUMIN SERPL-MCNC: 3.9 G/DL (ref 3.5–4.8)
ALP LIVER SERPL-CCNC: 128 U/L (ref 45–117)
ALT SERPL-CCNC: 27 U/L (ref 17–63)
APTT PPP: 31.7 SECONDS (ref 25–34)
AST SERPL-CCNC: 18 U/L (ref 15–41)
ATRIAL RATE: 64 BPM
BASOPHILS # BLD AUTO: 0.04 X10(3) UL (ref 0–0.1)
BASOPHILS NFR BLD AUTO: 0.4 %
BILIRUB SERPL-MCNC: 0.3 MG/DL (ref 0.1–2)
BUN BLD-MCNC: 31 MG/DL (ref 8–20)
CALCIUM BLD-MCNC: 9.9 MG/DL (ref 8.3–10.3)
CHLORIDE: 102 MMOL/L (ref 101–111)
CO2: 26 MMOL/L (ref 22–32)
CREAT BLD-MCNC: 1.38 MG/DL (ref 0.7–1.3)
EOSINOPHIL # BLD AUTO: 0.17 X10(3) UL (ref 0–0.3)
EOSINOPHIL NFR BLD AUTO: 1.9 %
ERYTHROCYTE [DISTWIDTH] IN BLOOD BY AUTOMATED COUNT: 14.9 % (ref 11.5–16)
GLUCOSE BLD-MCNC: 99 MG/DL (ref 70–99)
HCT VFR BLD AUTO: 42.9 % (ref 37–53)
HGB BLD-MCNC: 13.5 G/DL (ref 13–17)
IMMATURE GRANULOCYTE COUNT: 0.05 X10(3) UL (ref 0–1)
IMMATURE GRANULOCYTE RATIO %: 0.5 %
INR BLD: 0.92 (ref 0.89–1.11)
LYMPHOCYTES # BLD AUTO: 2.66 X10(3) UL (ref 0.9–4)
LYMPHOCYTES NFR BLD AUTO: 29.2 %
M PROTEIN MFR SERPL ELPH: 8.9 G/DL (ref 6.1–8.3)
MCH RBC QN AUTO: 31.1 PG (ref 27–33.2)
MCHC RBC AUTO-ENTMCNC: 31.5 G/DL (ref 31–37)
MCV RBC AUTO: 98.8 FL (ref 80–99)
MONOCYTES # BLD AUTO: 0.55 X10(3) UL (ref 0.1–0.6)
MONOCYTES NFR BLD AUTO: 6 %
NEUTROPHIL ABS PRELIM: 5.63 X10 (3) UL (ref 1.3–6.7)
NEUTROPHILS # BLD AUTO: 5.63 X10(3) UL (ref 1.3–6.7)
NEUTROPHILS NFR BLD AUTO: 62 %
P AXIS: 58 DEGREES
P-R INTERVAL: 206 MS
PLATELET # BLD AUTO: 217 10(3)UL (ref 150–450)
POTASSIUM SERPL-SCNC: 4.9 MMOL/L (ref 3.6–5.1)
PSA SERPL DL<=0.01 NG/ML-MCNC: 12.3 SECONDS (ref 12–14.3)
Q-T INTERVAL: 432 MS
QRS DURATION: 94 MS
QTC CALCULATION (BEZET): 445 MS
R AXIS: -14 DEGREES
RBC # BLD AUTO: 4.34 X10(6)UL (ref 3.8–5.8)
RED CELL DISTRIBUTION WIDTH-SD: 54.1 FL (ref 35.1–46.3)
SODIUM SERPL-SCNC: 138 MMOL/L (ref 136–144)
T AXIS: 49 DEGREES
T3FREE SERPL-MCNC: 2.6 PG/ML (ref 2.3–4.2)
VENTRICULAR RATE: 64 BPM
WBC # BLD AUTO: 9.1 X10(3) UL (ref 4–13)

## 2017-03-13 PROCEDURE — 80053 COMPREHEN METABOLIC PANEL: CPT

## 2017-03-13 PROCEDURE — 85610 PROTHROMBIN TIME: CPT

## 2017-03-13 PROCEDURE — 36415 COLL VENOUS BLD VENIPUNCTURE: CPT

## 2017-03-13 PROCEDURE — 87081 CULTURE SCREEN ONLY: CPT

## 2017-03-13 PROCEDURE — 84481 FREE ASSAY (FT-3): CPT

## 2017-03-13 PROCEDURE — 85025 COMPLETE CBC W/AUTO DIFF WBC: CPT

## 2017-03-13 PROCEDURE — 93010 ELECTROCARDIOGRAM REPORT: CPT | Performed by: INTERNAL MEDICINE

## 2017-03-13 PROCEDURE — 99214 OFFICE O/P EST MOD 30 MIN: CPT | Performed by: FAMILY MEDICINE

## 2017-03-13 PROCEDURE — 81001 URINALYSIS AUTO W/SCOPE: CPT

## 2017-03-13 PROCEDURE — 93005 ELECTROCARDIOGRAM TRACING: CPT

## 2017-03-13 PROCEDURE — 85730 THROMBOPLASTIN TIME PARTIAL: CPT

## 2017-03-13 RX ORDER — AMLODIPINE BESYLATE 5 MG/1
5 TABLET ORAL DAILY
Qty: 30 TABLET | Refills: 1 | Status: SHIPPED | OUTPATIENT
Start: 2017-03-13 | End: 2017-05-02

## 2017-03-13 NOTE — TELEPHONE ENCOUNTER
WANTS TO KNOW IF DR. SIDDIQUI WANTS HIM TO DO A STRESS TEST BEFORE SURGERY? HE CAN NOT GET INTO A CARDIOLOGIST TILL April 7TH AND HIS SURGERY DATE IS April 11TH. PLS CALL HIM BACK TO ADVISE.

## 2017-03-14 LAB
BILIRUB UR QL STRIP.AUTO: NEGATIVE
CLARITY UR REFRACT.AUTO: CLEAR
GLUCOSE UR STRIP.AUTO-MCNC: NEGATIVE MG/DL
KETONES UR STRIP.AUTO-MCNC: NEGATIVE MG/DL
LEUKOCYTE ESTERASE UR QL STRIP.AUTO: NEGATIVE
NITRITE UR QL STRIP.AUTO: NEGATIVE
PH UR STRIP.AUTO: 5 [PH] (ref 4.5–8)
PROT UR STRIP.AUTO-MCNC: 100 MG/DL
RBC UR QL AUTO: NEGATIVE
SP GR UR STRIP.AUTO: 1.01 (ref 1–1.03)
UROBILINOGEN UR STRIP.AUTO-MCNC: <2 MG/DL

## 2017-03-16 ENCOUNTER — HOSPITAL ENCOUNTER (OUTPATIENT)
Dept: CV DIAGNOSTICS | Facility: HOSPITAL | Age: 82
Discharge: HOME | End: 2017-03-16
Attending: FAMILY MEDICINE
Payer: MEDICARE

## 2017-03-16 DIAGNOSIS — I10 ESSENTIAL HYPERTENSION: ICD-10-CM

## 2017-03-16 DIAGNOSIS — R06.00 DYSPNEA ON EXERTION: ICD-10-CM

## 2017-03-16 DIAGNOSIS — E11.9 TYPE 2 DIABETES MELLITUS WITHOUT COMPLICATION, WITHOUT LONG-TERM CURRENT USE OF INSULIN (HCC): ICD-10-CM

## 2017-03-16 PROCEDURE — 93018 CV STRESS TEST I&R ONLY: CPT | Performed by: INTERNAL MEDICINE

## 2017-03-16 PROCEDURE — 93017 CV STRESS TEST TRACING ONLY: CPT

## 2017-03-16 PROCEDURE — 78452 HT MUSCLE IMAGE SPECT MULT: CPT

## 2017-03-16 PROCEDURE — 78452 HT MUSCLE IMAGE SPECT MULT: CPT | Performed by: INTERNAL MEDICINE

## 2017-03-20 ENCOUNTER — HOSPITAL ENCOUNTER (OUTPATIENT)
Dept: ULTRASOUND IMAGING | Age: 82
Discharge: HOME | End: 2017-03-20
Attending: FAMILY MEDICINE
Payer: MEDICARE

## 2017-03-20 ENCOUNTER — HOSPITAL ENCOUNTER (OUTPATIENT)
Dept: GENERAL RADIOLOGY | Age: 82
Discharge: HOME | End: 2017-03-20
Attending: FAMILY MEDICINE
Payer: MEDICARE

## 2017-03-20 DIAGNOSIS — Z01.818 PREOP EXAMINATION: ICD-10-CM

## 2017-03-20 DIAGNOSIS — I71.4 ABDOMINAL AORTIC ANEURYSM (AAA) WITHOUT RUPTURE (HCC): ICD-10-CM

## 2017-03-20 DIAGNOSIS — G47.33 OBSTRUCTIVE SLEEP APNEA (ADULT) (PEDIATRIC): ICD-10-CM

## 2017-03-20 DIAGNOSIS — R06.00 DYSPNEA ON EXERTION: ICD-10-CM

## 2017-03-20 PROCEDURE — 71020 XR CHEST PA + LAT CHEST (CPT=71020): CPT

## 2017-03-20 PROCEDURE — 76770 US EXAM ABDO BACK WALL COMP: CPT

## 2017-03-21 ENCOUNTER — HOSPITAL ENCOUNTER (OUTPATIENT)
Dept: PHYSICAL THERAPY | Facility: HOSPITAL | Age: 82
Discharge: HOME | End: 2017-03-21
Attending: ORTHOPAEDIC SURGERY
Payer: MEDICARE

## 2017-03-21 DIAGNOSIS — M51.16 LUMBAR DISC DISEASE WITH RADICULOPATHY: ICD-10-CM

## 2017-03-21 NOTE — PROGRESS NOTES
HPI:    Patient ID: Bel Muniz is a 80year old male. HPI Comments: Pt is here for preop examination. Pt is having spinal surgery with Dr. Britney Causey on 4/11/17. preop requesterd by surgeon.   Pt has been having lumbar radiculopathy which is not Comment:Procedure: COLONOSCOPY;  Surgeon: Etta Riddle MD;  Location: [facility] ENDOSCOPY    Carpal tunnel release                           Right               Other                                                            Comment:Yomaira (two) times daily as needed.  ) Disp: 2 Bottle Rfl: 6   Glucose Blood (FREESTYLE LITE TEST) In Vitro Strip Test one time daily Disp: 100 strip Rfl: 5   Ibuprofen (ADVIL OR) Take  by mouth. Disp:  Rfl:    Pyridoxine HCl (VITAMIN B6 OR) Take  by mouth.  Disp: normal.   Vitals reviewed.              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 Dyspnea on exertion  - XR CHEST PA + LAT CHEST (CPT=71020);  Future  - Cardio Referral - In [facility]; Future      Orders Placed Th

## 2017-03-27 ENCOUNTER — OFFICE VISIT (OUTPATIENT)
Dept: FAMILY MEDICINE CLINIC | Facility: CLINIC | Age: 82
End: 2017-03-27

## 2017-03-27 VITALS
HEIGHT: 64 IN | OXYGEN SATURATION: 96 % | TEMPERATURE: 98 F | WEIGHT: 186 LBS | DIASTOLIC BLOOD PRESSURE: 60 MMHG | RESPIRATION RATE: 20 BRPM | BODY MASS INDEX: 31.76 KG/M2 | HEART RATE: 70 BPM | SYSTOLIC BLOOD PRESSURE: 116 MMHG

## 2017-03-27 DIAGNOSIS — R09.89 CHEST CONGESTION: Primary | ICD-10-CM

## 2017-03-27 DIAGNOSIS — J30.9 ALLERGIC RHINITIS, UNSPECIFIED ALLERGIC RHINITIS TRIGGER, UNSPECIFIED RHINITIS SEASONALITY: ICD-10-CM

## 2017-03-27 PROCEDURE — 99213 OFFICE O/P EST LOW 20 MIN: CPT | Performed by: FAMILY MEDICINE

## 2017-03-27 RX ORDER — MONTELUKAST SODIUM 10 MG/1
10 TABLET ORAL DAILY
Qty: 90 TABLET | Refills: 3 | Status: SHIPPED | OUTPATIENT
Start: 2017-03-27 | End: 2017-06-27

## 2017-03-28 NOTE — PROGRESS NOTES
HPI:    Patient ID: Patrica Apley is a 80year old male. HPI Comments: Reviewed all previous testing with pt. Results look fine and pt should be cleared for surgery pending cardiology appointment.   s lexiscan stress test was normal.    Asked pt abo taking differently: Place 1 drop into both eyes 2 (two) times daily as needed.  ) Disp: 2 Bottle Rfl: 6   Glucose Blood (FREESTYLE LITE TEST) In Vitro Strip Test one time daily Disp: 100 strip Rfl: 5   Ibuprofen (ADVIL OR) Take  by mouth.  Disp:  Rfl:    Py orders of the defined types were placed in this encounter. Meds This Visit:  Signed Prescriptions Disp Refills    Montelukast Sodium (SINGULAIR) 10 MG Oral Tab 90 tablet 3      Sig: Take 1 tablet (10 mg total) by mouth daily.            Imaging & Refe

## 2017-04-11 ENCOUNTER — HOSPITAL ENCOUNTER (INPATIENT)
Facility: HOSPITAL | Age: 82
LOS: 3 days | Discharge: HOME HEALTH | DRG: 460 | End: 2017-04-14
Attending: ORTHOPAEDIC SURGERY | Admitting: ORTHOPAEDIC SURGERY
Payer: MEDICARE

## 2017-04-11 ENCOUNTER — APPOINTMENT (OUTPATIENT)
Dept: GENERAL RADIOLOGY | Facility: HOSPITAL | Age: 82
DRG: 460 | End: 2017-04-11
Attending: ORTHOPAEDIC SURGERY
Payer: MEDICARE

## 2017-04-11 ENCOUNTER — ANESTHESIA EVENT (OUTPATIENT)
Dept: SURGERY | Facility: HOSPITAL | Age: 82
End: 2017-04-11

## 2017-04-11 ENCOUNTER — SURGERY (OUTPATIENT)
Age: 82
End: 2017-04-11

## 2017-04-11 ENCOUNTER — ANESTHESIA (OUTPATIENT)
Dept: SURGERY | Facility: HOSPITAL | Age: 82
End: 2017-04-11

## 2017-04-11 DIAGNOSIS — M51.16 LUMBAR DISC DISEASE WITH RADICULOPATHY: Primary | ICD-10-CM

## 2017-04-11 PROCEDURE — 4A11X4G MONITORING OF PERIPHERAL NERVOUS ELECTRICAL ACTIVITY, INTRAOPERATIVE, EXTERNAL APPROACH: ICD-10-PCS | Performed by: ORTHOPAEDIC SURGERY

## 2017-04-11 PROCEDURE — 99222 1ST HOSP IP/OBS MODERATE 55: CPT | Performed by: HOSPITALIST

## 2017-04-11 PROCEDURE — 0SG00AJ FUSION OF LUMBAR VERTEBRAL JOINT WITH INTERBODY FUSION DEVICE, POSTERIOR APPROACH, ANTERIOR COLUMN, OPEN APPROACH: ICD-10-PCS | Performed by: ORTHOPAEDIC SURGERY

## 2017-04-11 PROCEDURE — 3E0U0GB INTRODUCTION OF RECOMBINANT BONE MORPHOGENETIC PROTEIN INTO JOINTS, OPEN APPROACH: ICD-10-PCS | Performed by: ORTHOPAEDIC SURGERY

## 2017-04-11 PROCEDURE — 0SB20ZZ EXCISION OF LUMBAR VERTEBRAL DISC, OPEN APPROACH: ICD-10-PCS | Performed by: ORTHOPAEDIC SURGERY

## 2017-04-11 PROCEDURE — 76001 XR FLUOROSCOPE EXAM >1 HR EXTENSIVE (CPT=76001): CPT

## 2017-04-11 DEVICE — FLOSEAL SEALENT STERILE 10ML: Type: IMPLANTABLE DEVICE | Site: BACK | Status: FUNCTIONAL

## 2017-04-11 DEVICE — SCREW SET SPNE VRST GRY: Type: IMPLANTABLE DEVICE | Site: BACK | Status: FUNCTIONAL

## 2017-04-11 DEVICE — ROD SPNL GRY 45MM 5.5MM BLT: Type: IMPLANTABLE DEVICE | Site: BACK | Status: FUNCTIONAL

## 2017-04-11 DEVICE — BONE GRAFT KIT 7510200 INFUSE SMALL
Type: IMPLANTABLE DEVICE | Site: BACK | Status: FUNCTIONAL
Brand: INFUSE® BONE GRAFT

## 2017-04-11 DEVICE — GRAFT BN DMNR FBR 30ML: Type: IMPLANTABLE DEVICE | Site: BACK | Status: FUNCTIONAL

## 2017-04-11 RX ORDER — PRAVASTATIN SODIUM 20 MG
20 TABLET ORAL NIGHTLY
Status: DISCONTINUED | OUTPATIENT
Start: 2017-04-11 | End: 2017-04-14

## 2017-04-11 RX ORDER — DIPHENHYDRAMINE HYDROCHLORIDE 50 MG/ML
12.5 INJECTION INTRAMUSCULAR; INTRAVENOUS AS NEEDED
Status: DISCONTINUED | OUTPATIENT
Start: 2017-04-11 | End: 2017-04-11 | Stop reason: HOSPADM

## 2017-04-11 RX ORDER — NALOXONE HYDROCHLORIDE 0.4 MG/ML
80 INJECTION, SOLUTION INTRAMUSCULAR; INTRAVENOUS; SUBCUTANEOUS AS NEEDED
Status: DISCONTINUED | OUTPATIENT
Start: 2017-04-11 | End: 2017-04-11 | Stop reason: HOSPADM

## 2017-04-11 RX ORDER — DEXTROSE MONOHYDRATE 25 G/50ML
50 INJECTION, SOLUTION INTRAVENOUS
Status: DISCONTINUED | OUTPATIENT
Start: 2017-04-11 | End: 2017-04-11 | Stop reason: HOSPADM

## 2017-04-11 RX ORDER — NALOXONE HYDROCHLORIDE 0.4 MG/ML
0.08 INJECTION, SOLUTION INTRAMUSCULAR; INTRAVENOUS; SUBCUTANEOUS
Status: DISCONTINUED | OUTPATIENT
Start: 2017-04-11 | End: 2017-04-14

## 2017-04-11 RX ORDER — SENNOSIDES 8.6 MG
17.2 TABLET ORAL NIGHTLY
Status: DISCONTINUED | OUTPATIENT
Start: 2017-04-11 | End: 2017-04-11

## 2017-04-11 RX ORDER — MORPHINE SULFATE 2 MG/ML
2 INJECTION, SOLUTION INTRAMUSCULAR; INTRAVENOUS EVERY 5 MIN PRN
Status: DISCONTINUED | OUTPATIENT
Start: 2017-04-11 | End: 2017-04-11 | Stop reason: HOSPADM

## 2017-04-11 RX ORDER — BUPIVACAINE HYDROCHLORIDE AND EPINEPHRINE 5; 5 MG/ML; UG/ML
INJECTION, SOLUTION EPIDURAL; INTRACAUDAL; PERINEURAL AS NEEDED
Status: DISCONTINUED | OUTPATIENT
Start: 2017-04-11 | End: 2017-04-11 | Stop reason: HOSPADM

## 2017-04-11 RX ORDER — HYDROCODONE BITARTRATE AND ACETAMINOPHEN 5; 325 MG/1; MG/1
1 TABLET ORAL EVERY 4 HOURS PRN
Status: DISCONTINUED | OUTPATIENT
Start: 2017-04-11 | End: 2017-04-11

## 2017-04-11 RX ORDER — BISACODYL 10 MG
10 SUPPOSITORY, RECTAL RECTAL
Status: DISCONTINUED | OUTPATIENT
Start: 2017-04-11 | End: 2017-04-14

## 2017-04-11 RX ORDER — DIPHENHYDRAMINE HYDROCHLORIDE 50 MG/ML
25 INJECTION INTRAMUSCULAR; INTRAVENOUS EVERY 4 HOURS PRN
Status: DISCONTINUED | OUTPATIENT
Start: 2017-04-11 | End: 2017-04-14

## 2017-04-11 RX ORDER — SENNOSIDES 8.6 MG
17.2 TABLET ORAL NIGHTLY
Status: DISCONTINUED | OUTPATIENT
Start: 2017-04-11 | End: 2017-04-14

## 2017-04-11 RX ORDER — POLYETHYLENE GLYCOL 3350 17 G/17G
17 POWDER, FOR SOLUTION ORAL DAILY PRN
Status: DISCONTINUED | OUTPATIENT
Start: 2017-04-11 | End: 2017-04-11

## 2017-04-11 RX ORDER — DOCUSATE SODIUM 100 MG/1
100 CAPSULE, LIQUID FILLED ORAL 2 TIMES DAILY
Status: DISCONTINUED | OUTPATIENT
Start: 2017-04-11 | End: 2017-04-11

## 2017-04-11 RX ORDER — METOCLOPRAMIDE HYDROCHLORIDE 5 MG/ML
5 INJECTION INTRAMUSCULAR; INTRAVENOUS EVERY 6 HOURS PRN
Status: DISCONTINUED | OUTPATIENT
Start: 2017-04-11 | End: 2017-04-14

## 2017-04-11 RX ORDER — DIPHENHYDRAMINE HCL 25 MG
25 CAPSULE ORAL EVERY 4 HOURS PRN
Status: DISCONTINUED | OUTPATIENT
Start: 2017-04-11 | End: 2017-04-11

## 2017-04-11 RX ORDER — MEPERIDINE HYDROCHLORIDE 25 MG/ML
12.5 INJECTION INTRAMUSCULAR; INTRAVENOUS; SUBCUTANEOUS AS NEEDED
Status: COMPLETED | OUTPATIENT
Start: 2017-04-11 | End: 2017-04-11

## 2017-04-11 RX ORDER — SODIUM PHOSPHATE, DIBASIC AND SODIUM PHOSPHATE, MONOBASIC 7; 19 G/133ML; G/133ML
1 ENEMA RECTAL ONCE AS NEEDED
Status: DISCONTINUED | OUTPATIENT
Start: 2017-04-11 | End: 2017-04-11

## 2017-04-11 RX ORDER — LABETALOL HYDROCHLORIDE 5 MG/ML
5 INJECTION, SOLUTION INTRAVENOUS EVERY 5 MIN PRN
Status: DISCONTINUED | OUTPATIENT
Start: 2017-04-11 | End: 2017-04-11 | Stop reason: HOSPADM

## 2017-04-11 RX ORDER — METOCLOPRAMIDE HYDROCHLORIDE 5 MG/ML
10 INJECTION INTRAMUSCULAR; INTRAVENOUS EVERY 6 HOURS PRN
Status: DISCONTINUED | OUTPATIENT
Start: 2017-04-11 | End: 2017-04-11

## 2017-04-11 RX ORDER — PANTOPRAZOLE SODIUM 20 MG/1
20 TABLET, DELAYED RELEASE ORAL
Status: DISCONTINUED | OUTPATIENT
Start: 2017-04-12 | End: 2017-04-14

## 2017-04-11 RX ORDER — ALLOPURINOL 300 MG/1
300 TABLET ORAL DAILY
COMMUNITY
End: 2018-01-01

## 2017-04-11 RX ORDER — ONDANSETRON 2 MG/ML
4 INJECTION INTRAMUSCULAR; INTRAVENOUS EVERY 6 HOURS PRN
Status: DISCONTINUED | OUTPATIENT
Start: 2017-04-11 | End: 2017-04-11

## 2017-04-11 RX ORDER — CYCLOBENZAPRINE HCL 10 MG
10 TABLET ORAL NIGHTLY
Qty: 30 TABLET | Refills: 0 | Status: SHIPPED | OUTPATIENT
Start: 2017-04-11 | End: 2017-04-25 | Stop reason: ALTCHOICE

## 2017-04-11 RX ORDER — AZELASTINE 1 MG/ML
1 SPRAY, METERED NASAL 2 TIMES DAILY
Status: DISCONTINUED | OUTPATIENT
Start: 2017-04-11 | End: 2017-04-14

## 2017-04-11 RX ORDER — DIPHENHYDRAMINE HYDROCHLORIDE 50 MG/ML
25 INJECTION INTRAMUSCULAR; INTRAVENOUS EVERY 4 HOURS PRN
Status: DISCONTINUED | OUTPATIENT
Start: 2017-04-11 | End: 2017-04-11

## 2017-04-11 RX ORDER — SODIUM PHOSPHATE, DIBASIC AND SODIUM PHOSPHATE, MONOBASIC 7; 19 G/133ML; G/133ML
1 ENEMA RECTAL ONCE AS NEEDED
Status: ACTIVE | OUTPATIENT
Start: 2017-04-11 | End: 2017-04-11

## 2017-04-11 RX ORDER — HYDROMORPHONE HYDROCHLORIDE 1 MG/ML
0.4 INJECTION, SOLUTION INTRAMUSCULAR; INTRAVENOUS; SUBCUTANEOUS EVERY 30 MIN PRN
Status: DISCONTINUED | OUTPATIENT
Start: 2017-04-11 | End: 2017-04-11

## 2017-04-11 RX ORDER — QUINAPRIL HCL AND HYDROCHLOROTHIAZIDE 20; 12.5 MG/1; MG/1
1 TABLET ORAL DAILY
Status: DISCONTINUED | OUTPATIENT
Start: 2017-04-11 | End: 2017-04-11 | Stop reason: SDUPTHER

## 2017-04-11 RX ORDER — HYDROMORPHONE HYDROCHLORIDE 1 MG/ML
INJECTION, SOLUTION INTRAMUSCULAR; INTRAVENOUS; SUBCUTANEOUS
Status: COMPLETED
Start: 2017-04-11 | End: 2017-04-11

## 2017-04-11 RX ORDER — SODIUM CHLORIDE, SODIUM LACTATE, POTASSIUM CHLORIDE, CALCIUM CHLORIDE 600; 310; 30; 20 MG/100ML; MG/100ML; MG/100ML; MG/100ML
INJECTION, SOLUTION INTRAVENOUS CONTINUOUS
Status: DISCONTINUED | OUTPATIENT
Start: 2017-04-11 | End: 2017-04-14

## 2017-04-11 RX ORDER — HYDROMORPHONE HYDROCHLORIDE 1 MG/ML
0.4 INJECTION, SOLUTION INTRAMUSCULAR; INTRAVENOUS; SUBCUTANEOUS EVERY 30 MIN PRN
Status: DISCONTINUED | OUTPATIENT
Start: 2017-04-11 | End: 2017-04-14

## 2017-04-11 RX ORDER — MONTELUKAST SODIUM 10 MG/1
10 TABLET ORAL DAILY
Status: DISCONTINUED | OUTPATIENT
Start: 2017-04-12 | End: 2017-04-14

## 2017-04-11 RX ORDER — CEFAZOLIN SODIUM 1 G/3ML
INJECTION, POWDER, FOR SOLUTION INTRAMUSCULAR; INTRAVENOUS
Status: DISCONTINUED | OUTPATIENT
Start: 2017-04-11 | End: 2017-04-11

## 2017-04-11 RX ORDER — NALOXONE HYDROCHLORIDE 0.4 MG/ML
0.08 INJECTION, SOLUTION INTRAMUSCULAR; INTRAVENOUS; SUBCUTANEOUS
Status: DISCONTINUED | OUTPATIENT
Start: 2017-04-11 | End: 2017-04-11

## 2017-04-11 RX ORDER — COLCHICINE 0.6 MG/1
0.6 TABLET ORAL DAILY
Status: DISCONTINUED | OUTPATIENT
Start: 2017-04-12 | End: 2017-04-14

## 2017-04-11 RX ORDER — ONDANSETRON 2 MG/ML
4 INJECTION INTRAMUSCULAR; INTRAVENOUS EVERY 4 HOURS PRN
Status: DISCONTINUED | OUTPATIENT
Start: 2017-04-11 | End: 2017-04-11

## 2017-04-11 RX ORDER — AMLODIPINE BESYLATE 5 MG/1
5 TABLET ORAL DAILY
Status: DISCONTINUED | OUTPATIENT
Start: 2017-04-12 | End: 2017-04-14

## 2017-04-11 RX ORDER — SODIUM CHLORIDE 9 MG/ML
INJECTION, SOLUTION INTRAVENOUS CONTINUOUS
Status: DISCONTINUED | OUTPATIENT
Start: 2017-04-11 | End: 2017-04-14

## 2017-04-11 RX ORDER — HYDROCODONE BITARTRATE AND ACETAMINOPHEN 5; 325 MG/1; MG/1
2 TABLET ORAL EVERY 4 HOURS PRN
Status: DISCONTINUED | OUTPATIENT
Start: 2017-04-11 | End: 2017-04-14

## 2017-04-11 RX ORDER — ACETAMINOPHEN 325 MG/1
650 TABLET ORAL EVERY 4 HOURS PRN
Status: DISCONTINUED | OUTPATIENT
Start: 2017-04-11 | End: 2017-04-14

## 2017-04-11 RX ORDER — NALBUPHINE HCL 10 MG/ML
2.5 AMPUL (ML) INJECTION EVERY 4 HOURS PRN
Status: DISCONTINUED | OUTPATIENT
Start: 2017-04-11 | End: 2017-04-11

## 2017-04-11 RX ORDER — DEXTROSE MONOHYDRATE 25 G/50ML
50 INJECTION, SOLUTION INTRAVENOUS
Status: DISCONTINUED | OUTPATIENT
Start: 2017-04-11 | End: 2017-04-14

## 2017-04-11 RX ORDER — CYCLOBENZAPRINE HCL 10 MG
10 TABLET ORAL EVERY 8 HOURS PRN
Status: DISCONTINUED | OUTPATIENT
Start: 2017-04-11 | End: 2017-04-14

## 2017-04-11 RX ORDER — MEPERIDINE HYDROCHLORIDE 25 MG/ML
INJECTION INTRAMUSCULAR; INTRAVENOUS; SUBCUTANEOUS
Status: COMPLETED
Start: 2017-04-11 | End: 2017-04-11

## 2017-04-11 RX ORDER — HYDROCODONE BITARTRATE AND ACETAMINOPHEN 5; 325 MG/1; MG/1
1 TABLET ORAL EVERY 4 HOURS PRN
Status: DISCONTINUED | OUTPATIENT
Start: 2017-04-11 | End: 2017-04-14

## 2017-04-11 RX ORDER — GENTAMICIN SULFATE 40 MG/ML
INJECTION, SOLUTION INTRAMUSCULAR; INTRAVENOUS AS NEEDED
Status: DISCONTINUED | OUTPATIENT
Start: 2017-04-11 | End: 2017-04-11 | Stop reason: HOSPADM

## 2017-04-11 RX ORDER — ONDANSETRON 2 MG/ML
4 INJECTION INTRAMUSCULAR; INTRAVENOUS EVERY 4 HOURS PRN
Status: ACTIVE | OUTPATIENT
Start: 2017-04-11 | End: 2017-04-12

## 2017-04-11 RX ORDER — NALBUPHINE HCL 10 MG/ML
2.5 AMPUL (ML) INJECTION EVERY 4 HOURS PRN
Status: DISCONTINUED | OUTPATIENT
Start: 2017-04-11 | End: 2017-04-14

## 2017-04-11 RX ORDER — HYDROMORPHONE HYDROCHLORIDE 1 MG/ML
0.4 INJECTION, SOLUTION INTRAMUSCULAR; INTRAVENOUS; SUBCUTANEOUS EVERY 5 MIN PRN
Status: DISCONTINUED | OUTPATIENT
Start: 2017-04-11 | End: 2017-04-11 | Stop reason: HOSPADM

## 2017-04-11 RX ORDER — POLYETHYLENE GLYCOL 3350 17 G/17G
17 POWDER, FOR SOLUTION ORAL DAILY PRN
Status: DISCONTINUED | OUTPATIENT
Start: 2017-04-11 | End: 2017-04-14

## 2017-04-11 RX ORDER — DIPHENHYDRAMINE HYDROCHLORIDE 50 MG/ML
12.5 INJECTION INTRAMUSCULAR; INTRAVENOUS EVERY 4 HOURS PRN
Status: DISCONTINUED | OUTPATIENT
Start: 2017-04-11 | End: 2017-04-11

## 2017-04-11 RX ORDER — CYCLOBENZAPRINE HCL 10 MG
10 TABLET ORAL EVERY 8 HOURS PRN
Status: DISCONTINUED | OUTPATIENT
Start: 2017-04-11 | End: 2017-04-11

## 2017-04-11 RX ORDER — METOCLOPRAMIDE HYDROCHLORIDE 5 MG/ML
10 INJECTION INTRAMUSCULAR; INTRAVENOUS AS NEEDED
Status: DISCONTINUED | OUTPATIENT
Start: 2017-04-11 | End: 2017-04-11 | Stop reason: HOSPADM

## 2017-04-11 RX ORDER — HYDROCODONE BITARTRATE AND ACETAMINOPHEN 5; 325 MG/1; MG/1
2 TABLET ORAL EVERY 4 HOURS PRN
Status: DISCONTINUED | OUTPATIENT
Start: 2017-04-11 | End: 2017-04-11

## 2017-04-11 RX ORDER — DIPHENHYDRAMINE HYDROCHLORIDE 50 MG/ML
12.5 INJECTION INTRAMUSCULAR; INTRAVENOUS EVERY 4 HOURS PRN
Status: DISCONTINUED | OUTPATIENT
Start: 2017-04-11 | End: 2017-04-14

## 2017-04-11 RX ORDER — ALLOPURINOL 300 MG/1
300 TABLET ORAL DAILY
Status: DISCONTINUED | OUTPATIENT
Start: 2017-04-12 | End: 2017-04-14

## 2017-04-11 RX ORDER — HYDROCODONE BITARTRATE AND ACETAMINOPHEN 5; 325 MG/1; MG/1
1 TABLET ORAL EVERY 6 HOURS PRN
Qty: 90 TABLET | Refills: 0 | Status: SHIPPED | OUTPATIENT
Start: 2017-04-11 | End: 2017-05-02 | Stop reason: ALTCHOICE

## 2017-04-11 RX ORDER — BISACODYL 10 MG
10 SUPPOSITORY, RECTAL RECTAL
Status: DISCONTINUED | OUTPATIENT
Start: 2017-04-11 | End: 2017-04-11

## 2017-04-11 RX ORDER — DIPHENHYDRAMINE HCL 25 MG
25 CAPSULE ORAL EVERY 4 HOURS PRN
Status: DISCONTINUED | OUTPATIENT
Start: 2017-04-11 | End: 2017-04-14

## 2017-04-11 RX ORDER — DOCUSATE SODIUM 100 MG/1
100 CAPSULE, LIQUID FILLED ORAL 2 TIMES DAILY
Status: DISCONTINUED | OUTPATIENT
Start: 2017-04-11 | End: 2017-04-14

## 2017-04-11 RX ORDER — SIMVASTATIN 10 MG
10 TABLET ORAL NIGHTLY
COMMUNITY
End: 2017-09-06

## 2017-04-11 NOTE — H&P
[facility]    Spine Surgery H&P  Dr. Denisha Bianchi Patient Status:  Surgery Admit    3/9/1935 MRN [id number]   [facility] SURGERY Attending Reji Carpenter MD   [address] Day #  PCP Nicole Roach DO     Subjective: bilateral    Nonorganic Pain Signs:  nondermatomal pain distribution, distraction, overreaction, nonantomic tenderness negative  HEENT:   No hoarseness, No difficulty swallowing. Exam is unremarkable. Without scleral icterus.   Mucous membranes are moist. [facility]  4/11/2017  7:25 AM

## 2017-04-11 NOTE — PROGRESS NOTES
NURSING ADMISSION NOTE      Patient admitted via bed, received awake, alert and oriented x 3. Oriented to room. Safety precautions initiated. Bed in low position. Call light in reach. Instructed to call for assistance.   Discussed post-op orders, ve

## 2017-04-11 NOTE — OPERATIVE REPORT
[facility]  Operative Report    [address] Patient Status:  Surgery Admit    3/9/1935 MRN [id number]   [facility] SURGERY Attending Peter Billings MD   Hosp Day # 0 PCP Cameron Dumontch,          PREOPERATIVE DIAGNOSIS:  Sp and monitoring leads were placed. A time-out was called with regard to patient identification, diagnosis, procedure, DVT prophylaxis, antibiotic prophylaxis, consent, availability, sterility of implant, x-rays.  All operating personnel concurred and were in The rods were inserted through the screw extenders and preliminary and subsequently finally tightened. We removed the tap extenders and then irrigated.  We closed the fascia with single interrupted Vicryl running stitches, subcutaneous layer with 2-0 Vicryl

## 2017-04-12 ENCOUNTER — APPOINTMENT (OUTPATIENT)
Dept: GENERAL RADIOLOGY | Facility: HOSPITAL | Age: 82
DRG: 460 | End: 2017-04-12
Attending: PHYSICIAN ASSISTANT
Payer: MEDICARE

## 2017-04-12 PROBLEM — E11.9 DM2 (DIABETES MELLITUS, TYPE 2) (HCC): Status: ACTIVE | Noted: 2017-04-12

## 2017-04-12 PROCEDURE — 99232 SBSQ HOSP IP/OBS MODERATE 35: CPT | Performed by: INTERNAL MEDICINE

## 2017-04-12 PROCEDURE — 72100 X-RAY EXAM L-S SPINE 2/3 VWS: CPT

## 2017-04-12 RX ORDER — HYDROCODONE BITARTRATE AND ACETAMINOPHEN 10; 325 MG/1; MG/1
2 TABLET ORAL EVERY 4 HOURS PRN
Status: DISCONTINUED | OUTPATIENT
Start: 2017-04-12 | End: 2017-04-14

## 2017-04-12 RX ORDER — HYDROCODONE BITARTRATE AND ACETAMINOPHEN 10; 325 MG/1; MG/1
1 TABLET ORAL EVERY 4 HOURS PRN
Status: DISCONTINUED | OUTPATIENT
Start: 2017-04-12 | End: 2017-04-14

## 2017-04-12 NOTE — PROGRESS NOTES
Spoke with patient concerning his history of YANIRA. Patient does not wear a CPAP at home. He stated he did have one a long time ago, but never wore it & sold it at a garage sale.   He said he got through more than 80 years of life without wearing it, so he wa

## 2017-04-12 NOTE — CM/SW NOTE
04/12/17 1400   CM/SW Referral Data   Referral Source Physician   Reason for Referral Discharge planning   Informant Patient;Spouse   Patient Info   Patient's Mental Status Alert   Patient's [address]   Number of Levels in Home 1   Number o

## 2017-04-12 NOTE — ANESTHESIA PREPROCEDURE EVALUATION
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    Procedure(s):  LAMINECTOMY WITH INSTRUMENTATION AND FUSION LUMBAR   4-5       Surgeon(s) and Role:     * Anabelle Intravenous Q4H PRN   lactated ringers infusion  Intravenous Continuous   [DISCONTINUED] CeFAZolin Sodium (ANCEF/KEFZOL) IVPB premix 2 g 2 g Intravenous Q8H   [DISCONTINUED] ondansetron HCl (ZOFRAN) injection 4 mg 4 mg Intravenous Q6H PRN   Naloxone HCl (N Intravenous Q5 Min PRN   [DISCONTINUED] Labetalol HCl (TRANDATE) injection 5 mg 5 mg Intravenous Q5 Min PRN   [DISCONTINUED] Metoclopramide HCl (REGLAN) injection 10 mg 10 mg Intravenous PRN   [DISCONTINUED] DiphenhydrAMINE HCl (BENADRYL) injection 12.5 mg HCl (NUBAIN) injection 2.5 mg 2.5 mg Intravenous Q4H PRN   HYDROmorphone HCl PF (DILAUDID) 1 MG/ML injection 0.4 mg 0.4 mg Intravenous Q30 Min PRN   HYDROmorphone (DILAUDID) 0.2 mg/ml PCA infusion  Intravenous Continuous   Metoclopramide HCl (REGLAN) injec Release Take 20 mg by mouth 2 (two) times daily before meals. Disp:  Rfl:    Azelastine HCl 0.05 % Ophthalmic Solution Place 1 drop into both eyes 2 (two) times daily.  (Patient taking differently: Place 1 drop into both eyes 2 (two) times daily as needed pre-op labs reviewed.     Lab Results  Component Value Date   WBC 12.8 04/11/2017   RBC 3.85 04/11/2017   HGB 12.0* 04/11/2017   HCT 36.8* 04/11/2017   MCV 95.6 04/11/2017   MCH 31.2 04/11/2017   MCHC 32.6 04/11/2017   RDW 14.6 04/11/2017   .0 04/11/

## 2017-04-12 NOTE — PROGRESS NOTES
[facility]  Progress Note     [address] Patient Status:  Inpatient    3/9/1935 MRN [id number]   [facility] 3SW-A Attending Jose G Kim MD   Hosp Day # 1 PCP Wil Quiñonez DO     Chief Complaint: s/p lumbar laminect Pravastatin Sodium  20 mg Oral Nightly   • insulin aspart  1-5 Units Subcutaneous TID CC and HS   • lisinopril-hydrochlorothiazide (ZESTORETIC 20-12.5) combination tablet   Oral Daily       ASSESSMENT / PLAN:     1. S/p lumbar laminectomy/fusion  1.  POD 1

## 2017-04-12 NOTE — CONSULTS
[facility]  [facility] Patient Status:  Inpatient    3/9/1935 MRN [id number]   [facility] 3SW-A Attending Jody Matos MD   Hosp Day # 0 PCP Pola Medellin DO     Reason for consult: medical management Relation Age of Onset   • Heart Disease Mother        Allergies: No Known Allergies    Medications:    No current facility-administered medications on file prior to encounter.   Current Outpatient Prescriptions on File Prior to Encounter:  [place] wheezes. No rhonchi. Cardiovascular: S1, S2. Regular rate and rhythm. No murmurs, rubs or gallops. Chest and Back: No tenderness or deformity. Abdomen: Soft, nontender, nondistended. Positive bowel sounds. No rebound, guarding or organomegaly.   Neurol

## 2017-04-12 NOTE — CONSULTS
[facility] Pharmacy Note:  Pain Consult    Alli Worley is a 80year old male started on Dilaudid PCA by Dr. Jose Mishra. Pharmacy was consulted to review medication profile and to discontinue previously ordered narcotics and sedatives.     Medication profile

## 2017-04-12 NOTE — PROGRESS NOTES
[facility] Pharmacy Note:  Renal Adjustment for Ancef (cefazolin)    Cal George is a 80year old male who has been prescribed Ancef (cefazolin) 2 gram IV every 8 hrs. CrCl is CrCl cannot be calculated (Patient has no serum creatinine result on file. ). so

## 2017-04-12 NOTE — PROGRESS NOTES
[facility]  Progress Note    [address] Patient Status:  Inpatient    3/9/1935 MRN [id number]   [facility] 3SW-A Attending Alli Andrew MD   [facility] Day # 1 PCP Sd Serrano DO     Subjective:  [address] is a(n) 8 and extension, lateral rotation and lateral flexion of cervical spine. No JVD. Supple. Lungs: Clear to auscultation bilaterally. Cardiac: Regular rate and rhythm. No murmur. Abdomen:  Soft, non-distended, non-tender, with no rebound or guarding.   No p

## 2017-04-12 NOTE — ANESTHESIA POSTPROCEDURE EVALUATION
[address] [facility] Patient Status:  Inpatient   Age/Gender 80year old male MRN [id number]   [facility] 3SW-A Attending Keyla Worley MD   Hosp Day # 0 PCP Gemini Chavez DO       Anesthesia Post-op Note    Procedure(s

## 2017-04-12 NOTE — PHYSICAL THERAPY NOTE
PHYSICAL THERAPY EVALUATION - INPATIENT     Room Number: 384/384-A  Evaluation Date: 4/12/2017  Type of Evaluation: Initial  Physician Order: PT Eval and Treat    Presenting Problem: s/p lumbar lami/fusion  Reason for Therapy: Mobility Dysfunction and walker  Patient Regularly Uses: Glasses    Prior Level of [place]: pt reporting he was walking household distances (40-50) at a time with no AD.  Pt had difficulty with farther distances and prolonged standing secondary to pain> He denies any recent fa (AM-PAC Scale): 38.1   CMS Modifier (G-Code): CL    FUNCTIONAL ABILITY STATUS  Gait Assessment   Gait Assistance: Dependent assistance  Distance (ft): 100  Assistive Device: Rolling walker  Pattern: Shuffle; Ataxic          Skilled Therapy Provided: pt rece decreased strength, decreased balance, gait dysfunction. These impairments manifest themselves as functional limitations in mobility and functional independence. Pt's AM-PAC score demonstrating 61.29% functional impairment.    The patient is below his bas

## 2017-04-12 NOTE — HOME CARE LIAISON
[facility] referral for [facility] on d[facility]c for SN/PT. Met with patient who is agreeable to [facility] on d/c. Agency brochure given to patient. Referral sent to [facility] via [address]    Thank you for this referral,   Ethan Negrete

## 2017-04-12 NOTE — OCCUPATIONAL THERAPY NOTE
OCCUPATIONAL THERAPY EVALUATION - INPATIENT     Room Number: 384/384-A  Evaluation Date: 4/12/2017  Type of Evaluation: Initial  Presenting Problem: s/p L4-L5 lami fusion 4/11/17    Physician Order: IP Consult to Occupational Therapy  Reason for Therapy: A toilet  Shower/Tub and Equipment: Walk-in shower;Grab bar; Shower chair  Other Equipment: None    Occupation/Status: Retired     Drives: Yes       Prior Level of [place]: Patient reports independent in all I/ADL and functional mobility without device p Taking care of personal grooming such as brushing teeth?: A Little  -   Eating meals?: A Little    AM-PAC Score:  Score: 14  Approx Degree of Impairment: 59.67%  Standardized Score (AM-PAC Scale): 33.39  CMS Modifier (G-Code): CK    FUNCTIONAL TRANSFER ASS pain management with good verbal understanding. Patient End of Session: Up in chair;Needs met;Call light within reach;RN aware of session/findings; All patient questions and concerns addressed;SCDs in place; Family present; Discussed recommendations with ca Meet Established Goals: 3    ADL GOALS   Patient will perform Lower Body Dressing with supervision  Patient will perform Toileting with supervision    FUNCTIONAL TRANSFER GOALS   Patient will transfer to Toilet with supervision    ADDITIONAL GOALS  Patient

## 2017-04-12 NOTE — PROGRESS NOTES
[facility] Pharmacy Note:  Renal Dose Adjustment for Metoclopramide (REGLAN)    Juana Wiseman has been prescribed Metoclopramide (REGLAN) 10 mg every 6 hours as needed for nausea. CrCl cannot be calculated (Patient has no serum creatinine result on file. ).

## 2017-04-13 PROCEDURE — 99231 SBSQ HOSP IP/OBS SF/LOW 25: CPT | Performed by: INTERNAL MEDICINE

## 2017-04-13 NOTE — PROGRESS NOTES
[facility]  Progress Note    [address] Patient Status:  Inpatient    3/9/1935 MRN [id number]   [facility] 3SW-A Attending Shree Duron MD   [facility] Day # 2 PCP Felix Miller DO     Subjective:  [address] is a(n) 8 extension, lateral rotation and lateral flexion of cervical spine. No JVD. Supple. Lungs: Clear to auscultation bilaterally. Cardiac: Regular rate and rhythm. No murmur. Abdomen:  Soft, non-distended, non-tender, with no rebound or guarding.   No perit

## 2017-04-13 NOTE — PLAN OF CARE
DISCHARGE PLANNING    • Discharge to home or other facility with appropriate resources Progressing        Diabetes/Glucose Control    • Glucose maintained within prescribed range Progressing        Impaired Activities of Daily Living    • Achieve highest/s

## 2017-04-13 NOTE — PLAN OF CARE
Diabetes/Glucose Control    • Glucose maintained within prescribed range Progressing        Impaired Activities of Daily Living    • Achieve highest/safest level of independence in self care Progressing        Impaired Functional Mobility    • Achieve high

## 2017-04-13 NOTE — OCCUPATIONAL THERAPY NOTE
OCCUPATIONAL THERAPY TREATMENT NOTE - INPATIENT     Room Number: 384/384-A  Session: 1  Number of Visits to Meet Established Goals: 3    Presenting Problem: s/p L4-L5 lami fusion 4/11/17    History related to current admission: Patient with history of L4-5 ASSESSMENT  Ratin  Location: back  Management Techniques: Activity promotion; Body mechanics;Breathing techniques;Relaxation;Repositioning     ACTIVITY TOLERANCE  O2 Saturation: 93%  Room air  Shortness of breath    ACTIVITIES OF DAILY LIVING ASSESSMENT min assist x2, safety cues and heavy use of grab bar > toileting via max assist for brief management and min assist for balance, patient again unable to unweight UEs from RW > functional mobility back to bedside chair via RW, CGA to min assist, increased t Plan: Balance activities; ADL training;Functional transfer training; Endurance training;Patient/Family education;Patient/Family training; Compensatory technique education  Rehab Potential : Good  Frequency (Obs): 5x/week    ADL GOALS   Patient will perform Lo

## 2017-04-13 NOTE — PROGRESS NOTES
Gave second dose of norco this afternoon. Discontinued PCA at this time. Pain rated at 1.5 when assessed, will monitor pain level frequently as shift progresses to maintain adequate pain control for this patient.

## 2017-04-13 NOTE — PROGRESS NOTES
[facility]  Progress Note     [address] Patient Status:  Inpatient    3/9/1935 MRN [id number]   [facility] 3SW-A Attending Ryan Marshall MD   Hosp Day # 2 PCP Wojciech Dennis DO     Chief Complaint: s/p lumbar laminect 20 mg Oral Nightly   • insulin aspart  1-5 Units Subcutaneous TID CC and HS   • lisinopril-hydrochlorothiazide (ZESTORETIC 20-12.5) combination tablet   Oral Daily       ASSESSMENT / PLAN:     1. S/p lumbar laminectomy/fusion  1. POD 2  2.  Management per s

## 2017-04-13 NOTE — PROGRESS NOTES
Patient and  (wife) attended discharge spine education/snack class. Printed discharge education sheet provided and reviewed. Teach back done. Questions solicited and answered. Tolerated activity well.

## 2017-04-13 NOTE — PHYSICAL THERAPY NOTE
PHYSICAL THERAPY TREATMENT NOTE - INPATIENT    Room Number: 384/384-A     Session: 1   Number of Visits to Meet Established Goals: 4    Presenting Problem: s/p lumbar lami/fusion    Problem List  Active Problems:    S/P lumbar fusion    Benign essential H Static Sitting: Fair -  Dynamic Sitting: Poor +           Static Standing: Poor +  Dynamic Standing: Poor +    ACTIVITY TOLERANCE  O2 Saturation: 95%  Room air    AM-PAC '6-Clicks' INPATIENT SHORT FORM - BASIC MOBILITY  How much difficulty does the patient Session: Up in chair;Needs met;Call light within reach;RN aware of session/findings;SCDs in place; All patient questions and concerns addressed         ASSESSMENT      Patient is a 80year old male admitted 4/11/2017 for lumbar fusion .    In this PT evaluat

## 2017-04-14 VITALS
TEMPERATURE: 98 F | BODY MASS INDEX: 32.44 KG/M2 | HEIGHT: 64 IN | RESPIRATION RATE: 18 BRPM | DIASTOLIC BLOOD PRESSURE: 53 MMHG | WEIGHT: 190 LBS | HEART RATE: 91 BPM | OXYGEN SATURATION: 98 % | SYSTOLIC BLOOD PRESSURE: 117 MMHG

## 2017-04-14 PROCEDURE — 99231 SBSQ HOSP IP/OBS SF/LOW 25: CPT | Performed by: INTERNAL MEDICINE

## 2017-04-14 NOTE — PHYSICAL THERAPY NOTE
PHYSICAL THERAPY TREATMENT NOTE - INPATIENT    Room Number: 384/384-A     Session: 2  Number of Visits to Meet Established Goals: 4    Presenting Problem: s/p lumbar lami/fusion    Problem List  Active Problems:    S/P lumbar fusion    Benign essential HT Static Sitting: Fair -  Dynamic Sitting: Poor +           Static Standing: Poor +  Dynamic Standing: Poor +    ACTIVITY TOLERANCE  O2 Saturation: 95%  Room air    AM-PAC '6-Clicks' INPATIENT SHORT FORM - BASIC MOBILITY  How much difficulty does the patient session/findings; All patient questions and concerns addressed;SCDs in place         ASSESSMENT      Patient is a 80year old male admitted 4/11/2017 for lumbar fusion .    In this PT evaluation, the patient presents with the following impairments: decreased

## 2017-04-14 NOTE — OCCUPATIONAL THERAPY NOTE
OCCUPATIONAL THERAPY TREATMENT NOTE - INPATIENT     Room Number: 384/384-A  Session: 2  Number of Visits to Meet Established Goals: 3    Presenting Problem: s/p L4-L5 lami fusion 4/11/17    History related to current admission: Patient with history of L4-5 2  Location: back  Management Techniques: Activity promotion; Body mechanics;Breathing techniques;Relaxation;Repositioning     ACTIVITY TOLERANCE  O2 Saturation: 93%  Room air  Shortness of breath    ACTIVITIES OF DAILY LIVING ASSESSMENT  AM-PAC ‘6-Clicks’ (reports prefers this to 3-in-1 commode) via min assist to control descent and heavy use of grab bar; discussed recommendation of RTS with arms or safety frame and where to purchase, patient resistant but reports knows where to get if he needs > standing v patient continues to demonstrate a 50.11f% approximate degree of ADL impairment on the AM-PAC 6-Clicks Assessment, indicating patient could benefit from rehab placement at discharge. Continue to recommend [facility] at discharge.  If patient unwilling or unable to

## 2017-04-14 NOTE — DISCHARGE SUMMARY
Discharge Summary  Patient ID:  Ezekiel Petty  [id number]  80year old  3/9/1935    Admit date: 4/11/2017    Discharge date and time: 4/14/17    Attending Physician: No att. providers found     Reason for admission: postop    Discharge Diagnoses: [facility] Normal, Disp-30 tablet, R-1    Quinapril-Hydrochlorothiazide 20-12.5 MG Oral Tab  Take 1 tablet by mouth daily. , Normal, Disp-90 tablet, R-1    Azelastine HCl 0.1 % Nasal Solution  1 spray by Nasal route 2 (two) times daily. , Normal, Disp-1 Bottle, R-5

## 2017-04-14 NOTE — PROGRESS NOTES
[facility]  Progress Note    [address] Patient Status:  Inpatient    3/9/1935 MRN [id number]   [facility] 3SW-A Attending Yudy Craft MD   [facility] Day # 3 PCP Jonnathan Lowry DO     Subjective:  [address] is a(n) 8 rhythm. No murmur. Abdomen:  Soft, non-distended, non-tender, with no rebound or guarding. No peritoneal signs. No ascites. Liver is within normal limits. Spleen is not palpable. Skin: Normal texture and turgor.   Lymphatic:  No palpable cervical l

## 2017-04-14 NOTE — CM/SW NOTE
04/14/17 1454   Discharge disposition   Discharged to: Home-Health   Name of Facillity/Home Care/Hospice Residential   Patient Yvette Yes   Discharge transportation Private car

## 2017-04-14 NOTE — PROGRESS NOTES
[facility]  Progress Note     [address] Patient Status:  Inpatient    3/9/1935 MRN [id number]   [facility] 3SW-A Attending Taylor Marte MD   [facility] Day # 3 PCP Ruben Schmitt DO     Chief Complaint: s/p lumbar laminect • Pravastatin Sodium  20 mg Oral Nightly   • insulin aspart  1-5 Units Subcutaneous TID CC and HS   • lisinopril-hydrochlorothiazide (ZESTORETIC 20-12.5) combination tablet   Oral Daily       ASSESSMENT / PLAN:     1. S/p lumbar laminectomy/fusion  1.  PO

## 2017-04-15 NOTE — PROGRESS NOTES
Patient discharged to home this afternoon. Patient to follow up with his PCP within 1 week and with Dr Imtiaz Yi in 2 weeks. Scripts provided to patient and spouse prior to depart this evening.    Reviewed all discharge materials with patient and spouse pr

## 2017-04-15 NOTE — PLAN OF CARE
DISCHARGE PLANNING    • Discharge to home or other facility with appropriate resources Adequate for Discharge        Diabetes/Glucose Control    • Glucose maintained within prescribed range Adequate for Discharge        Impaired Activities of Daily Living

## 2017-04-17 ENCOUNTER — TELEPHONE (OUTPATIENT)
Dept: ORTHOPEDICS CLINIC | Facility: CLINIC | Age: 82
End: 2017-04-17

## 2017-04-17 ENCOUNTER — PATIENT OUTREACH (OUTPATIENT)
Dept: CASE MANAGEMENT | Age: 82
End: 2017-04-17

## 2017-04-17 ENCOUNTER — TELEPHONE (OUTPATIENT)
Dept: FAMILY MEDICINE CLINIC | Facility: CLINIC | Age: 82
End: 2017-04-17

## 2017-04-17 DIAGNOSIS — E78.5 DYSLIPIDEMIA: ICD-10-CM

## 2017-04-17 DIAGNOSIS — Z98.1 S/P LUMBAR FUSION: Primary | ICD-10-CM

## 2017-04-17 DIAGNOSIS — E11.9 TYPE 2 DIABETES MELLITUS WITHOUT COMPLICATION, WITHOUT LONG-TERM CURRENT USE OF INSULIN (HCC): ICD-10-CM

## 2017-04-17 DIAGNOSIS — I10 ESSENTIAL HYPERTENSION: ICD-10-CM

## 2017-04-17 NOTE — TELEPHONE ENCOUNTER
Home health has been started today. They will do pt, check his vitals and oxygen levels.  If you have any questions please call

## 2017-04-17 NOTE — TELEPHONE ENCOUNTER
Pt came home from surgery over the weekend - fell when he was walking to the bathroom - his legs buckled underneath him - he went to ER and had x rays done- everything was ok but they determined the muscle relaxers he was given are too strong possibly - he

## 2017-04-17 NOTE — TELEPHONE ENCOUNTER
I spoke to [place]   Patient is doing better today. He went to the ER yesterday due to leg weakness. It seems his muscle relaxant was too strong and making him too fatigues. X-rays from the ER are reported to be normal.   No complaints today.    Patient

## 2017-04-17 NOTE — TELEPHONE ENCOUNTER
Called and spoke to Ellen Islas wife. Pt did have leg buckling prior to surgery  Legs just went out from under him over the week end. Went to the ER. No injury . No pain now. Taken off of Flexeril by wife so far.   No dose yesterday or today as of ye

## 2017-04-17 NOTE — PROGRESS NOTES
Initial Post Discharge Follow Up   Discharge Date: 4/14/17  Contact Date: 4/17/2017    Consent Verification:  Assessment Completed With: Spouse: Sigifredo Mitchell received per patient?  verbal  HIPAA Verified? Yes    1.  Tell me why you were in the hospital Cyclobenzaprine HCl (FLEXERIL) 10 MG Oral Tab Take 1 tablet (10 mg total) by mouth nightly. Disp: 30 tablet Rfl: 0   Pyridoxine HCl (VITAMIN B-6 OR) Take 1 tablet by mouth daily.  Disp:  Rfl:    Montelukast Sodium (SINGULAIR) 10 MG Oral Tab Take 1 tablet medicine know about the side effects you are having? No, no medication side effects per wife. Pt was having side effects from Flexeril but is no longer experiencing weakness or confusion since stopping.     10. Have you received your home health care servi [phone]  [phone]                   Interventions:  Reviewed s/s of DVT and infection with wife. All d/c instructions reviewed with wife. Wife stated understanding. [facility] RN and PT will be coming to see pt this afternoon.   Wife states HH is to eli

## 2017-04-19 ENCOUNTER — TELEPHONE (OUTPATIENT)
Dept: FAMILY MEDICINE CLINIC | Facility: CLINIC | Age: 82
End: 2017-04-19

## 2017-04-19 NOTE — TELEPHONE ENCOUNTER
[facility] is calling to request Order for OT and also to allow them to check oxygen saturation levels       Test Results Looking for last A1C result for patient     Please see attached message   Gave verbal approval for OT to begin.   Provided most recent Hgb

## 2017-04-19 NOTE — TELEPHONE ENCOUNTER
Notifying there was a delay of care due to the request of t he patient but care did begin on 4/17      Please see attached message from [facility]    No action is required.    Thanks

## 2017-04-21 ENCOUNTER — TELEPHONE (OUTPATIENT)
Dept: FAMILY MEDICINE CLINIC | Facility: CLINIC | Age: 82
End: 2017-04-21

## 2017-04-25 ENCOUNTER — LAB ENCOUNTER (OUTPATIENT)
Dept: LAB | Age: 82
End: 2017-04-25
Attending: FAMILY MEDICINE
Payer: MEDICARE

## 2017-04-25 ENCOUNTER — OFFICE VISIT (OUTPATIENT)
Dept: FAMILY MEDICINE CLINIC | Facility: CLINIC | Age: 82
End: 2017-04-25

## 2017-04-25 VITALS
TEMPERATURE: 99 F | WEIGHT: 190 LBS | DIASTOLIC BLOOD PRESSURE: 50 MMHG | RESPIRATION RATE: 18 BRPM | HEIGHT: 64 IN | HEART RATE: 68 BPM | BODY MASS INDEX: 32.44 KG/M2 | OXYGEN SATURATION: 98 % | SYSTOLIC BLOOD PRESSURE: 120 MMHG

## 2017-04-25 DIAGNOSIS — M1A.9XX0 CHRONIC GOUT INVOLVING TOE OF LEFT FOOT WITHOUT TOPHUS, UNSPECIFIED CAUSE: Primary | ICD-10-CM

## 2017-04-25 DIAGNOSIS — E11.9 TYPE 2 DIABETES MELLITUS WITHOUT COMPLICATION, WITHOUT LONG-TERM CURRENT USE OF INSULIN (HCC): Primary | ICD-10-CM

## 2017-04-25 DIAGNOSIS — R05.9 COUGH: ICD-10-CM

## 2017-04-25 DIAGNOSIS — E11.9 TYPE 2 DIABETES MELLITUS WITHOUT COMPLICATION, WITHOUT LONG-TERM CURRENT USE OF INSULIN (HCC): ICD-10-CM

## 2017-04-25 DIAGNOSIS — M1A.9XX0 CHRONIC GOUT INVOLVING TOE OF LEFT FOOT WITHOUT TOPHUS, UNSPECIFIED CAUSE: ICD-10-CM

## 2017-04-25 DIAGNOSIS — L29.3 ITCHING OF MALE GENITALIA: ICD-10-CM

## 2017-04-25 DIAGNOSIS — I10 ESSENTIAL HYPERTENSION: ICD-10-CM

## 2017-04-25 PROCEDURE — 83036 HEMOGLOBIN GLYCOSYLATED A1C: CPT

## 2017-04-25 PROCEDURE — 36415 COLL VENOUS BLD VENIPUNCTURE: CPT

## 2017-04-25 PROCEDURE — 85025 COMPLETE CBC W/AUTO DIFF WBC: CPT

## 2017-04-25 PROCEDURE — 80048 BASIC METABOLIC PNL TOTAL CA: CPT

## 2017-04-25 PROCEDURE — 99214 OFFICE O/P EST MOD 30 MIN: CPT | Performed by: FAMILY MEDICINE

## 2017-04-25 RX ORDER — OMEPRAZOLE 20 MG/1
20 CAPSULE, DELAYED RELEASE ORAL
Qty: 60 CAPSULE | Refills: 1 | Status: SHIPPED | OUTPATIENT
Start: 2017-04-25 | End: 2017-05-16

## 2017-04-25 RX ORDER — LOSARTAN POTASSIUM AND HYDROCHLOROTHIAZIDE 12.5; 1 MG/1; MG/1
1 TABLET ORAL DAILY
Qty: 30 TABLET | Refills: 0 | Status: SHIPPED | OUTPATIENT
Start: 2017-04-25 | End: 2017-05-02

## 2017-04-25 RX ORDER — PREDNISONE 20 MG/1
TABLET ORAL
Qty: 13 TABLET | Refills: 0 | Status: SHIPPED | OUTPATIENT
Start: 2017-04-25 | End: 2017-05-02 | Stop reason: ALTCHOICE

## 2017-04-25 NOTE — PATIENT INSTRUCTIONS
Gout Diet  Gout is a painful condition caused by an excess of uric acid, a waste product made by the body. Uric acid forms crystals that collect in the joints. The immune response to these crystals brings on symptoms of joint pain and swelling.  This is c · Dairy products that are low-fat or fat-free, such as cheese and yogurt  · Complex carbohydrate foods, including whole grains, brown rice, oats, and beans  · Coffee, in moderation  · Water, approximately 64 ounces per day  Follow-up care  Follow up with mendy

## 2017-04-26 NOTE — PROGRESS NOTES
HPI:    Patient ID: Juana Wiseman is a 80year old male. Gout  This is a chronic problem. The current episode started more than 1 year ago. The problem occurs intermittently. The problem has been waxing and waning.  Pertinent negatives include no olga lidia channel blockers. The current treatment provides significant improvement. Compliance problems: not taking medication regularly always. There is no history of angina or CAD/MI. Identifiable causes of hypertension include chronic renal disease.    Cough  Thi Vitamins-Minerals (OCUVITE OR) Take 1 tablet by mouth daily. Disp:  Rfl:    allopurinol 300 MG Oral Tab Take 300 mg by mouth daily. Disp:  Rfl:    simvastatin 10 MG Oral Tab Take 10 mg by mouth nightly.  Disp:  Rfl:    HYDROcodone-acetaminophen (NORCO) 5-32 respiratory distress. He has no wheezes. He has no rales. Abdominal: Soft. Bowel sounds are normal. He exhibits no distension and no mass. There is no tenderness. There is no rebound and no guarding. Musculoskeletal: Normal range of motion.    Left big Refill: 1      Orders Placed This Encounter  Hemoglobin A1C [E]    Meds This Visit:  Signed Prescriptions Disp Refills    triamcinolone acetonide 0.1 % External Cream 60 g 0      Sig: Apply to groin area twice daily as needed x 2 weeks, then 1 week break b

## 2017-04-28 ENCOUNTER — TELEPHONE (OUTPATIENT)
Dept: FAMILY MEDICINE CLINIC | Facility: CLINIC | Age: 82
End: 2017-04-28

## 2017-05-01 ENCOUNTER — PATIENT OUTREACH (OUTPATIENT)
Dept: CASE MANAGEMENT | Age: 82
End: 2017-05-01

## 2017-05-01 ENCOUNTER — TELEPHONE (OUTPATIENT)
Dept: CASE MANAGEMENT | Age: 82
End: 2017-05-01

## 2017-05-02 ENCOUNTER — OFFICE VISIT (OUTPATIENT)
Dept: FAMILY MEDICINE CLINIC | Facility: CLINIC | Age: 82
End: 2017-05-02

## 2017-05-02 VITALS
DIASTOLIC BLOOD PRESSURE: 62 MMHG | BODY MASS INDEX: 31.58 KG/M2 | HEART RATE: 83 BPM | OXYGEN SATURATION: 98 % | SYSTOLIC BLOOD PRESSURE: 160 MMHG | HEIGHT: 64 IN | RESPIRATION RATE: 20 BRPM | TEMPERATURE: 99 F | WEIGHT: 185 LBS

## 2017-05-02 DIAGNOSIS — R79.89 ELEVATED PLATELET COUNT: ICD-10-CM

## 2017-05-02 DIAGNOSIS — R79.89 ELEVATED PLATELET COUNT: Primary | ICD-10-CM

## 2017-05-02 DIAGNOSIS — I10 ESSENTIAL HYPERTENSION: ICD-10-CM

## 2017-05-02 PROCEDURE — 99213 OFFICE O/P EST LOW 20 MIN: CPT | Performed by: FAMILY MEDICINE

## 2017-05-02 RX ORDER — LOSARTAN POTASSIUM AND HYDROCHLOROTHIAZIDE 12.5; 1 MG/1; MG/1
1 TABLET ORAL DAILY
Qty: 90 TABLET | Refills: 3 | Status: SHIPPED | OUTPATIENT
Start: 2017-05-02 | End: 2018-01-01

## 2017-05-02 RX ORDER — AMLODIPINE BESYLATE 5 MG/1
5 TABLET ORAL DAILY
Qty: 30 TABLET | Refills: 1 | Status: SHIPPED | OUTPATIENT
Start: 2017-05-02 | End: 2017-11-01

## 2017-05-02 NOTE — PROGRESS NOTES
HPI:    Patient ID: Daniel Jiang is a 80year old male. HTN  This is a chronic problem. The current episode started more than 1 year ago. The problem has been gradually worsening since onset. The problem is uncontrolled.  Pertinent negatives include into both eyes 2 (two) times daily.  (Patient taking differently: Place 1 drop into both eyes 2 (two) times daily as needed.  ) Disp: 2 Bottle Rfl: 6   Glucose Blood (FREESTYLE LITE TEST) In Vitro Strip Test one time daily Disp: 100 strip Rfl: 5   [place] Prescriptions Disp Refills    AmLODIPine Besylate 5 MG Oral Tab 30 tablet 1      Sig: Take 1 tablet (5 mg total) by mouth daily. Losartan Potassium-HCTZ 100-12.5 MG Oral Tab 90 tablet 3      Sig: Take 1 tablet by mouth daily.            Imaging & Refer

## 2017-05-03 ENCOUNTER — TELEPHONE (OUTPATIENT)
Dept: FAMILY MEDICINE CLINIC | Facility: CLINIC | Age: 82
End: 2017-05-03

## 2017-05-03 NOTE — TELEPHONE ENCOUNTER
Doing refill's on patients medication she would like to know if we are aware that there is a contra-indication between the amlodapine and simvastatin.    Please call home health nurse back to let her know

## 2017-05-03 NOTE — TELEPHONE ENCOUNTER
Doing refill's on patients medication she would like to know if we are aware that there is a contra-indication between the amlodapine and simvastatin. Please call home health nurse back to let her know     Please see above message.   LOV 5/2/17 for f/u BP m

## 2017-05-04 NOTE — TELEPHONE ENCOUNTER
Yes, but he is on such a low dose of simvastatin it wont matter. Amlodipine can increase concentration of simvastatin. So if it increases from 10 to 20 equivalent it wont matter.

## 2017-05-11 ENCOUNTER — MED REC SCAN ONLY (OUTPATIENT)
Dept: FAMILY MEDICINE CLINIC | Facility: CLINIC | Age: 82
End: 2017-05-11

## 2017-05-16 ENCOUNTER — OFFICE VISIT (OUTPATIENT)
Dept: FAMILY MEDICINE CLINIC | Facility: CLINIC | Age: 82
End: 2017-05-16

## 2017-05-16 VITALS
WEIGHT: 188 LBS | SYSTOLIC BLOOD PRESSURE: 132 MMHG | HEART RATE: 73 BPM | BODY MASS INDEX: 32.1 KG/M2 | TEMPERATURE: 98 F | DIASTOLIC BLOOD PRESSURE: 60 MMHG | OXYGEN SATURATION: 98 % | RESPIRATION RATE: 20 BRPM | HEIGHT: 64 IN

## 2017-05-16 DIAGNOSIS — R05.3 CHRONIC COUGH: Primary | ICD-10-CM

## 2017-05-16 DIAGNOSIS — I10 BENIGN ESSENTIAL HTN: ICD-10-CM

## 2017-05-16 PROCEDURE — 99213 OFFICE O/P EST LOW 20 MIN: CPT | Performed by: FAMILY MEDICINE

## 2017-05-16 RX ORDER — OMEPRAZOLE 20 MG/1
20 CAPSULE, DELAYED RELEASE ORAL
Qty: 180 CAPSULE | Refills: 1 | Status: SHIPPED | OUTPATIENT
Start: 2017-05-16 | End: 2018-01-01

## 2017-05-16 RX ORDER — AMLODIPINE BESYLATE 10 MG/1
10 TABLET ORAL DAILY
Qty: 90 TABLET | Refills: 1 | Status: SHIPPED | OUTPATIENT
Start: 2017-05-16 | End: 2017-09-06 | Stop reason: ALTCHOICE

## 2017-05-17 NOTE — PROGRESS NOTES
HPI:    Patient ID: Ernestine Corea is a 80year old male. HTN  This is a chronic problem. The current episode started more than 1 year ago. The problem has been gradually improving since onset. The problem is controlled.  Pertinent negatives include n mouth daily. Disp: 90 tablet Rfl: 1   omeprazole 20 MG Oral Capsule Delayed Release Take 1 capsule (20 mg total) by mouth 2 (two) times daily before meals.  Disp: 180 capsule Rfl: 1   AmLODIPine Besylate 5 MG Oral Tab Take 1 tablet (5 mg total) by mouth chloe Pulmonary/Chest: Effort normal and breath sounds normal. No respiratory distress. He has no wheezes. He has no rales. Skin: He is not diaphoretic. Vitals reviewed.              ASSESSMENT/PLAN:   Chronic cough  (primary encounter diagnosis)  Benign es

## 2017-06-02 ENCOUNTER — TELEPHONE (OUTPATIENT)
Dept: FAMILY MEDICINE CLINIC | Facility: CLINIC | Age: 82
End: 2017-06-02

## 2017-06-27 PROBLEM — M20.22 HALLUX RIGIDUS, LEFT FOOT: Status: ACTIVE | Noted: 2017-06-27

## 2017-07-21 ENCOUNTER — TELEPHONE (OUTPATIENT)
Dept: FAMILY MEDICINE CLINIC | Facility: CLINIC | Age: 82
End: 2017-07-21

## 2017-07-21 ENCOUNTER — PATIENT OUTREACH (OUTPATIENT)
Dept: CASE MANAGEMENT | Age: 82
End: 2017-07-21

## 2017-07-21 DIAGNOSIS — I10 BENIGN ESSENTIAL HTN: ICD-10-CM

## 2017-07-21 DIAGNOSIS — E11.9 TYPE 2 DIABETES MELLITUS WITHOUT COMPLICATION, WITHOUT LONG-TERM CURRENT USE OF INSULIN (HCC): ICD-10-CM

## 2017-07-21 DIAGNOSIS — J68.3 REACTIVE AIRWAYS DYSFUNCTION SYNDROME, UNSPECIFIED ASTHMA SEVERITY, UNCOMPLICATED (HCC): ICD-10-CM

## 2017-07-21 DIAGNOSIS — E78.5 DYSLIPIDEMIA: ICD-10-CM

## 2017-07-21 DIAGNOSIS — C61 MALIGNANT NEOPLASM OF PROSTATE (HCC): ICD-10-CM

## 2017-07-21 DIAGNOSIS — E11.9 TYPE 2 DIABETES MELLITUS WITHOUT COMPLICATION, WITHOUT LONG-TERM CURRENT USE OF INSULIN (HCC): Primary | ICD-10-CM

## 2017-07-21 DIAGNOSIS — G47.33 OSA (OBSTRUCTIVE SLEEP APNEA): ICD-10-CM

## 2017-07-21 DIAGNOSIS — I71.4 ANEURYSM OF ABDOMINAL VESSEL (HCC): ICD-10-CM

## 2017-07-21 DIAGNOSIS — I10 ESSENTIAL HYPERTENSION: ICD-10-CM

## 2017-07-21 DIAGNOSIS — R13.10 DYSPHAGIA, UNSPECIFIED(787.20): ICD-10-CM

## 2017-07-21 PROCEDURE — 99490 CHRNC CARE MGMT STAFF 1ST 20: CPT

## 2017-07-21 NOTE — PROGRESS NOTES
7/21/2017  Spoke to Lori Henriquez at length about CCM, current care plan and performed CCM assessment with Lori Henriquze reviewed meds and compliance.  Reviewed pt Patient Active Problem List:     Obstructive sleep apnea (adult) (pediatric)     Dysphagia, unspecified(787.2 with surgery. Patient relates he is standing and walking better since surgery but wishes he had more stamina.       Big Toe: Patient relates saw a podiatrist that confirmed patient big toe is arthritis and not gout Patient tried to eliminate the Colchicine and validation to patient's concerns.    Monthly Minute Total including today: 32    Physical assessment, complete health history, and need for CCM established by Marina Corrales DO.

## 2017-07-21 NOTE — TELEPHONE ENCOUNTER
Spoke with patient who had a few questions    1. Patient relates saw Dr. Bridgett Mejia in April who recommend him to do echo. Patient wanted to see if you agreed with him getting an echo.     2. Patient also wanted to see if he was due for blood work as he could

## 2017-07-25 NOTE — TELEPHONE ENCOUNTER
Pt notified, agrees to schedule ECHO, number given    Pt will get labs done, however he states he d/c B vitamins and has had thyroid issues in past, would you like to add this to the labs?      Pt agrees to make f/u appt once labwork is completed

## 2017-07-25 NOTE — TELEPHONE ENCOUNTER
Yes get the ECHO,   Lab test in the computer. Get done when fasting. Come in to get hip evaluated and review blood test after he has done them.

## 2017-07-26 ENCOUNTER — PATIENT OUTREACH (OUTPATIENT)
Dept: CASE MANAGEMENT | Age: 82
End: 2017-07-26

## 2017-08-12 DIAGNOSIS — M1A.9XX0 CHRONIC GOUT WITHOUT TOPHUS, UNSPECIFIED CAUSE, UNSPECIFIED SITE: ICD-10-CM

## 2017-08-17 RX ORDER — SIMVASTATIN 10 MG
TABLET ORAL
Qty: 90 TABLET | Refills: 3 | Status: SHIPPED | OUTPATIENT
Start: 2017-08-17 | End: 2018-01-01

## 2017-08-18 ENCOUNTER — HOSPITAL ENCOUNTER (OUTPATIENT)
Dept: CV DIAGNOSTICS | Age: 82
Discharge: HOME | End: 2017-08-18
Attending: INTERNAL MEDICINE
Payer: MEDICARE

## 2017-08-18 ENCOUNTER — HOSPITAL ENCOUNTER (OUTPATIENT)
Dept: CV DIAGNOSTICS | Age: 82
End: 2017-08-18
Attending: INTERNAL MEDICINE
Payer: MEDICARE

## 2017-08-18 DIAGNOSIS — R06.02 SHORTNESS OF BREATH: ICD-10-CM

## 2017-08-18 DIAGNOSIS — Z01.810 PREOP CARDIOVASCULAR EXAM: ICD-10-CM

## 2017-08-18 PROCEDURE — 93306 TTE W/DOPPLER COMPLETE: CPT | Performed by: INTERNAL MEDICINE

## 2017-08-18 RX ORDER — ALLOPURINOL 300 MG/1
TABLET ORAL
Qty: 90 TABLET | Refills: 1 | Status: SHIPPED | OUTPATIENT
Start: 2017-08-18 | End: 2017-09-06

## 2017-08-30 ENCOUNTER — APPOINTMENT (OUTPATIENT)
Dept: LAB | Age: 82
End: 2017-08-30
Attending: FAMILY MEDICINE
Payer: MEDICARE

## 2017-08-30 DIAGNOSIS — E11.9 TYPE 2 DIABETES MELLITUS WITHOUT COMPLICATION, WITHOUT LONG-TERM CURRENT USE OF INSULIN (HCC): ICD-10-CM

## 2017-08-30 LAB
ALBUMIN SERPL-MCNC: 3.3 G/DL (ref 3.5–4.8)
ALP LIVER SERPL-CCNC: 126 U/L (ref 45–117)
ALT SERPL-CCNC: 28 U/L (ref 17–63)
AST SERPL-CCNC: 16 U/L (ref 15–41)
BASOPHILS # BLD AUTO: 0.06 X10(3) UL (ref 0–0.1)
BASOPHILS NFR BLD AUTO: 0.8 %
BILIRUB SERPL-MCNC: 0.3 MG/DL (ref 0.1–2)
BUN BLD-MCNC: 23 MG/DL (ref 8–20)
CALCIUM BLD-MCNC: 9.4 MG/DL (ref 8.3–10.3)
CHLORIDE: 107 MMOL/L (ref 101–111)
CHOLEST SMN-MCNC: 144 MG/DL (ref ?–200)
CO2: 27 MMOL/L (ref 22–32)
CREAT BLD-MCNC: 1.25 MG/DL (ref 0.7–1.3)
EOSINOPHIL # BLD AUTO: 0.68 X10(3) UL (ref 0–0.3)
EOSINOPHIL NFR BLD AUTO: 8.9 %
ERYTHROCYTE [DISTWIDTH] IN BLOOD BY AUTOMATED COUNT: 14.7 % (ref 11.5–16)
EST. AVERAGE GLUCOSE BLD GHB EST-MCNC: 134 MG/DL (ref 68–126)
GLUCOSE BLD-MCNC: 111 MG/DL (ref 70–99)
HBA1C MFR BLD HPLC: 6.3 % (ref ?–5.7)
HCT VFR BLD AUTO: 38.8 % (ref 37–53)
HDLC SERPL-MCNC: 38 MG/DL (ref 45–?)
HDLC SERPL: 3.79 {RATIO} (ref ?–4.97)
HGB BLD-MCNC: 12.2 G/DL (ref 13–17)
IMMATURE GRANULOCYTE COUNT: 0.05 X10(3) UL (ref 0–1)
IMMATURE GRANULOCYTE RATIO %: 0.7 %
LDLC SERPL CALC-MCNC: 66 MG/DL (ref ?–130)
LDLC SERPL-MCNC: 40 MG/DL (ref 5–40)
LYMPHOCYTES # BLD AUTO: 1.86 X10(3) UL (ref 0.9–4)
LYMPHOCYTES NFR BLD AUTO: 24.4 %
M PROTEIN MFR SERPL ELPH: 7.9 G/DL (ref 6.1–8.3)
MCH RBC QN AUTO: 30.9 PG (ref 27–33.2)
MCHC RBC AUTO-ENTMCNC: 31.4 G/DL (ref 31–37)
MCV RBC AUTO: 98.2 FL (ref 80–99)
MONOCYTES # BLD AUTO: 0.47 X10(3) UL (ref 0.1–0.6)
MONOCYTES NFR BLD AUTO: 6.2 %
NEUTROPHIL ABS PRELIM: 4.5 X10 (3) UL (ref 1.3–6.7)
NEUTROPHILS # BLD AUTO: 4.5 X10(3) UL (ref 1.3–6.7)
NEUTROPHILS NFR BLD AUTO: 59 %
NONHDLC SERPL-MCNC: 106 MG/DL (ref ?–130)
PLATELET # BLD AUTO: 204 10(3)UL (ref 150–450)
POTASSIUM SERPL-SCNC: 4.6 MMOL/L (ref 3.6–5.1)
RBC # BLD AUTO: 3.95 X10(6)UL (ref 3.8–5.8)
RED CELL DISTRIBUTION WIDTH-SD: 53 FL (ref 35.1–46.3)
SODIUM SERPL-SCNC: 139 MMOL/L (ref 136–144)
TRIGLYCERIDES: 201 MG/DL (ref ?–150)
WBC # BLD AUTO: 7.6 X10(3) UL (ref 4–13)

## 2017-08-30 PROCEDURE — 83036 HEMOGLOBIN GLYCOSYLATED A1C: CPT

## 2017-08-30 PROCEDURE — 80061 LIPID PANEL: CPT | Performed by: FAMILY MEDICINE

## 2017-08-30 PROCEDURE — 36415 COLL VENOUS BLD VENIPUNCTURE: CPT | Performed by: FAMILY MEDICINE

## 2017-08-30 PROCEDURE — 85025 COMPLETE CBC W/AUTO DIFF WBC: CPT | Performed by: FAMILY MEDICINE

## 2017-08-30 PROCEDURE — 80053 COMPREHEN METABOLIC PANEL: CPT | Performed by: FAMILY MEDICINE

## 2017-09-01 ENCOUNTER — TELEPHONE (OUTPATIENT)
Dept: FAMILY MEDICINE CLINIC | Facility: CLINIC | Age: 82
End: 2017-09-01

## 2017-09-04 PROBLEM — I77.811 ECTATIC ABDOMINAL AORTA (HCC): Status: ACTIVE | Noted: 2017-09-04

## 2017-09-04 PROBLEM — I70.0 AORTIC ATHEROSCLEROSIS (HCC): Status: ACTIVE | Noted: 2017-09-04

## 2017-09-04 PROBLEM — N18.30 TYPE 2 DIABETES MELLITUS WITH STAGE 3 CHRONIC KIDNEY DISEASE, WITHOUT LONG-TERM CURRENT USE OF INSULIN (HCC): Status: ACTIVE | Noted: 2017-04-12

## 2017-09-04 PROBLEM — E11.22 TYPE 2 DIABETES MELLITUS WITH STAGE 3 CHRONIC KIDNEY DISEASE, WITHOUT LONG-TERM CURRENT USE OF INSULIN (HCC): Status: ACTIVE | Noted: 2017-04-12

## 2017-09-04 PROBLEM — M47.816 ARTHRITIS, LUMBAR SPINE: Status: ACTIVE | Noted: 2017-09-04

## 2017-09-06 ENCOUNTER — OFFICE VISIT (OUTPATIENT)
Dept: FAMILY MEDICINE CLINIC | Facility: CLINIC | Age: 82
End: 2017-09-06

## 2017-09-06 VITALS
TEMPERATURE: 98 F | SYSTOLIC BLOOD PRESSURE: 136 MMHG | HEART RATE: 64 BPM | WEIGHT: 198 LBS | DIASTOLIC BLOOD PRESSURE: 82 MMHG | OXYGEN SATURATION: 95 % | RESPIRATION RATE: 18 BRPM | BODY MASS INDEX: 33.8 KG/M2 | HEIGHT: 64 IN

## 2017-09-06 DIAGNOSIS — Z23 NEEDS FLU SHOT: ICD-10-CM

## 2017-09-06 DIAGNOSIS — N39.3 STRESS INCONTINENCE: Primary | ICD-10-CM

## 2017-09-06 DIAGNOSIS — Z00.00 ENCOUNTER FOR ANNUAL HEALTH EXAMINATION: ICD-10-CM

## 2017-09-06 PROCEDURE — G0008 ADMIN INFLUENZA VIRUS VAC: HCPCS | Performed by: FAMILY MEDICINE

## 2017-09-06 PROCEDURE — 99213 OFFICE O/P EST LOW 20 MIN: CPT | Performed by: FAMILY MEDICINE

## 2017-09-06 PROCEDURE — 90686 IIV4 VACC NO PRSV 0.5 ML IM: CPT | Performed by: FAMILY MEDICINE

## 2017-09-06 RX ORDER — FLUTICASONE PROPIONATE 50 MCG
SPRAY, SUSPENSION (ML) NASAL
COMMUNITY
End: 2017-09-14

## 2017-09-06 RX ORDER — LIDOCAINE 50 MG/G
1 OINTMENT TOPICAL 3 TIMES DAILY PRN
Qty: 50 G | Refills: 0 | Status: SHIPPED | OUTPATIENT
Start: 2017-09-06 | End: 2017-01-01

## 2017-09-06 NOTE — PATIENT INSTRUCTIONS
[facility] SCREENING SCHEDULE   Tests on this list are recommended by your physician but may not be covered, or covered at this frequency, by your insurer. Please check with your insurance carrier before scheduling to verify coverage.     PREVENT indicated for medical reasons    Electrocardiogram date03/13/2017 Routine EKG is not a screening covered service except at the [place] to Medicare Visit    Abdominal aortic aneurysm screening (once between ages 73-68)  No results found for this or any prev visit. Please get once after your 65th birthday    Pneumococcal 23 (Pneumovax)  Covered Once after 65 No orders found for this or any previous visit.  Please get once after your 65th birthday    Hepatitis B for Moderate/High Risk No orders found for this or

## 2017-09-06 NOTE — PROGRESS NOTES
HPI:   Samantha Kim is a 80year old male who presents for a Medicare Subsequent Annual Wellness visit (Pt already had Initial Annual Wellness). + stress incontinence. No dysuria. No hematuria. No feeling like overly full bladder.   Only once n CBC  (most recent labs)     Lab Results  Component Value Date   WBC 7.6 08/30/2017   HGB 12.2 (L) 08/30/2017   .0 08/30/2017        ALLERGIES:   He has No Known Allergies.     CURRENT MEDICATIONS:     Outpatient Prescriptions Marked as Taking for or unspecified type diabetes mellitus without mention of complication, not stated as uncontrolled; and Unspecified essential hypertension.     He  has a past surgical history that includes hernia surgery (1/1/64); other surgical history (1/1/00); total hip Acuity: Yes      General Appearance:  Alert, cooperative, no distress, appears stated age   Head:  Normocephalic, without obvious abnormality, atraumatic   Eyes:  PERRL, conjunctiva/corneas clear, EOM's intact, both eyes   Ears:  Normal TM's and external e VISIT,EST,65 & OVER         Mr. Courtney Paez already takes aspirin and has it on his medication list.     Diet assessment: good     Advanced Directive:  Living Will on file in Epic? Ashly Person does not have a Living Will on file in [place].  Discussed wit last 12 months?: 0-No    Do you accidently lose urine?: 1-Yes    Do you have difficulty seeing?: 0-No    Do you have any difficulty walking or getting up?: 1-Yes    Do you have any tripping hazards?: 0-No    Are you on multiple medications?: 1-Yes    Does Date Value   08/30/2017 66        EKG - w/ Initial Preventative Physical Exam only, or if medically necessary Electrocardiogram date03/13/2017    Colorectal Cancer Screening      Colonoscopy Screen every 10 years There are no preventive care reminders to Annually No results found for: DIGOXIN, DIG, VALP No flowsheet data found.     Diabetes      HgbA1C  Annually HEMOGLOBIN A1c (% of total Hgb)   Date Value   12/20/2011 5.7 (H)     HGBA1C (%)   Date Value   04/28/2014 6.2 (H)     HgbA1C (%)   Date Value   08

## 2017-09-07 RX ORDER — FLUTICASONE PROPIONATE 50 MCG
SPRAY, SUSPENSION (ML) NASAL
Qty: 1 BOTTLE | Refills: 3 | Status: SHIPPED | OUTPATIENT
Start: 2017-09-07 | End: 2018-01-01

## 2017-09-25 ENCOUNTER — APPOINTMENT (OUTPATIENT)
Dept: LAB | Age: 82
End: 2017-09-25
Attending: UROLOGY
Payer: MEDICARE

## 2017-09-25 DIAGNOSIS — Z85.46 HISTORY OF PROSTATE CANCER: ICD-10-CM

## 2017-09-25 DIAGNOSIS — R97.20 ABNORMAL PSA: ICD-10-CM

## 2017-09-25 LAB — PSA SERPL-MCNC: 0.81 NG/ML (ref 0.01–4)

## 2017-09-25 PROCEDURE — 84153 ASSAY OF PSA TOTAL: CPT

## 2017-09-25 PROCEDURE — 36415 COLL VENOUS BLD VENIPUNCTURE: CPT

## 2017-10-05 ENCOUNTER — PATIENT OUTREACH (OUTPATIENT)
Dept: CASE MANAGEMENT | Age: 82
End: 2017-10-05

## 2017-10-05 NOTE — PROGRESS NOTES
Called patient and left a message. Coordination of education, review of chart, update to pt record, compilation and mailing resources/materials: Flu education, Why get the flu vaccine, and request to get flu vaccine with PCP and or [facility] locations.   Time:

## 2017-11-01 DIAGNOSIS — I10 ESSENTIAL HYPERTENSION: ICD-10-CM

## 2017-11-01 RX ORDER — AMLODIPINE BESYLATE 5 MG/1
TABLET ORAL
Qty: 30 TABLET | Refills: 1 | Status: SHIPPED | OUTPATIENT
Start: 2017-11-01 | End: 2017-01-01

## 2017-11-08 ENCOUNTER — TELEPHONE (OUTPATIENT)
Dept: FAMILY MEDICINE CLINIC | Facility: CLINIC | Age: 82
End: 2017-11-08

## 2017-12-05 NOTE — PROGRESS NOTES
HPI:    Patient ID: Torrey Mccann is a 80year old male. Hypertension   This is a chronic problem. The current episode started more than 1 year ago. The problem is unchanged. Associated symptoms include shortness of breath.  Pertinent negatives inclu problem occurs constantly. The problem has been unchanged. Associated symptoms include congestion, coughing and fatigue. Pertinent negatives include no chest pain or visual change. Nothing aggravates the symptoms. He has tried nothing for the symptoms.    Bryan Prophet mouth daily. Disp:  Rfl:    allopurinol 300 MG Oral Tab Take 300 mg by mouth daily. Disp:  Rfl:    Azelastine HCl 0.1 % Nasal Solution 1 spray by Nasal route 2 (two) times daily.  Disp: 1 Bottle Rfl: 5   Azelastine HCl 0.05 % Ophthalmic Solution Place 1 chely disease), stage III    - COMP METABOLIC PANEL (14)    4. Essential hypertension    - AmLODIPine Besylate 5 MG Oral Tab; TAKE ONE TABLET BY MOUTH ONCE DAILY  Dispense: 90 tablet; Refill: 3    5.  Type 2 diabetes mellitus without complication, without long-te

## 2017-12-06 NOTE — TELEPHONE ENCOUNTER
----- Message from Joseph Solitario DO sent at 12/5/2017  3:13 PM CST -----  Please have pt go for CT chest with IV contrast if GFR above 60 on current blood test otherwise just CT chest.  Also have him follow up with pulmonologist Dr. Mk Manriquez group.

## 2017-12-06 NOTE — TELEPHONE ENCOUNTER
Notes Recorded by Michela Connor RN on 12/6/2017 at 11:58 AM CST  Pt informed of both test results and recommendations andhe expressed understanding and agreement.      YP: pt wants to inform you he now remembers \"hitting my chest\" about 6 months ago an

## 2017-12-13 NOTE — PROCEDURES
Spirometry should be interpreted with caution since the patient was not able to perform forced maneuvers correctly with frequent/persistent cough     Spirometry and  flow volume loop are consistent with mild to moderate airway obstruction.   There was no ch

## 2017-12-13 NOTE — PROGRESS NOTES
HPI:    Patient ID: Lina Monzon is a 80year old male. CT scan shows lung mass. New. Not there previously. Shortness Of Breath   This is a chronic problem. The current episode started more than 1 year ago. The problem occurs constantly.  The capsule Rfl: 1   Losartan Potassium-HCTZ 100-12.5 MG Oral Tab Take 1 tablet by mouth daily. Disp: 90 tablet Rfl: 3   triamcinolone acetonide 0.1 % External Cream Apply to groin area twice daily as needed x 2 weeks, then 1 week break before reusing it.  Disp

## 2017-12-15 NOTE — ANESTHESIA PREPROCEDURE EVALUATION
PRE-OP EVALUATION    Patient Name: Patrica Apley    Pre-op Diagnosis: ABNORMAL CT    Procedure(s):  ENDOBRONCHIAL ULTRASOUND GUIDED LYMPH NODE BIOPSY AND FLOW CYTOMETRY    Surgeon(s) and Role:     * Negrita Dee MD - Primary    Pre-op vitals r was normal. Wall thickness was normal.     Systolic function was normal. The estimated ejection fraction was 55-60%.      There was no diagnostic evidence for regional wall motion abnormalities.     Doppler parameters are consistent with abnormal left ventr general  NPO status verified and patient meets guidelines. Comment: GA with ETT.  Risks discussed including sore throat, hoarse voice  Plan/risks discussed with: patient and spouse                Present on Admission:  **None**

## 2017-12-15 NOTE — H&P
Clinic note from 12/14 reviewed. No changes in signs of symptoms since then.     /69   Pulse 78   Temp 97.8 °F (36.6 °C) (Oral)   Resp 20   Ht 5' 4\" (1.626 m)   Wt 200 lb (90.7 kg)   SpO2 100%   BMI 34.33 kg/m²   Lungs- CTA ninfa  Heart- RRR no m/r/g

## 2017-12-15 NOTE — OPERATIVE REPORT
Bronchoscopy procedure report    Preop diagnosis: abnl CT  Postop diagnosis:  abnl CT  Procedure performed: Bronchoscopy, Diagnostic  TBNA of 4L under EBUS guidance    Sedation used: General Anesthesia  Moderate Sedation Time: NA    Description of procedur

## 2017-12-15 NOTE — ANESTHESIA POSTPROCEDURE EVALUATION
[address] [facility] Patient Status:  Hospital Outpatient Surgery   Age/Gender 80year old male MRN [id number]   Location [facility]. Attending Pete Conteh MD   Hosp Day # 0 PCP Los Maldonado DO       Anesthesia Post-

## 2017-12-20 NOTE — TELEPHONE ENCOUNTER
Patient has an appointment next Tuesday. There is an order in for a Thyroid Biopsy that needs clarification on Diagnosis. Can we please call to clarify or put in another order.

## 2017-12-20 NOTE — PROGRESS NOTES
12/20/2017  Spoke to Meena Gabriel at length about CCM, current care plan and performed CCM assessment with Meena Gabriel reviewed meds and compliance.  Reviewed pt Aortic atherosclerosis (hcc)  Benign essential htn  Type 2 diabetes mellitus without complication, without l mints, ice cream, cake, sweets, or soda pop) starting at 5pm.  You may take any medication needed before the appointment unless otherwise instructed by your physician. Do not eat any food for 6 hours before your appointment time; plain water is OK.   We wa appointment unless otherwise instructed by your physician. Do not eat any food for 6 hours before your appointment time; plain water is OK.   We want you well hydrated; try to drink at least 20 oz. of plain water (no tea, coffee, swati-aid, crystal light, o ibuprofen), please call the ordering physician to see if that medication can be held for 7 days or at the time of scheduling. It is important for Radiology to be notified if you are not able to hold any of the above medications.  Please call [phone]  [phone]          Care Plan: Reviewed and updated where needed    Time Spent This Encounter Total: 20 min medical record review, telephone communication, care plan updates where needed, and education.  Provided acknowledgment and validation to pa

## 2017-12-22 NOTE — TELEPHONE ENCOUNTER
Ashanti Blackmon, supervisor @ [facility] order for thyroid biopsy is good. No further action required. Task is done.

## 2017-12-26 NOTE — TELEPHONE ENCOUNTER
Dominant solid nodule in the mid to lower pole the right lobe measures 3.3 x 3.1 x 3.5 cm. There is a complex nodule in the upper pole of the left lobe measuring 1.1 x 0.6 x 1.1 cm.

## 2017-12-26 NOTE — TELEPHONE ENCOUNTER
Pt is having thyroid biopsy tomorrow. Need to know specifically what they want biopsied. Pt has appt tomorrow AM.  Need clarification todoay.

## 2017-12-26 NOTE — TELEPHONE ENCOUNTER
Pt scheduled for FNA biopsy on thyroid tomorrow. Do you only want this nodule biopsied? \"There has been interval increase in size of the dominant nodule in the mid to lower pole of the right lobe now measuring 3.3 x 3.1 x 3.5 cm.   Previous measurement wa

## 2017-12-27 NOTE — PROCEDURES
[facility]  Procedure Note    [address] [facility] Patient Status:  Outpatient    3/9/1935 MRN [id number]   Location [address] [facility] Attending Jonatan Jones DO   Hosp Day # 0 PCP Shaggy Chavez DO     Procedure: US guided FNA of bilateral th

## 2017-12-29 NOTE — PROGRESS NOTES
Patient here for consult. Patient had CT chest and Thyroid US on 12/12, 12/21 PET, and 12/27 Thyroid BX. Patient looking to discuss results and POC. Reviewed phone guide. Patient will need to be presented at lung clinic on Wednesday.      Education Record

## 2017-12-29 NOTE — CONSULTS
Cancer Center Report of Consultation    Patient Name: Satnam Jiménez   YOB: 1935   Medical Record Number: [id number]   CSN: [id number]   Consulting Physician: Heather Winters M.D.    Referring Physician: Eva Nowak    Date of C RELEASE Right  2/21/2017: COLONOSCOPY N/A      Comment: Procedure: COLONOSCOPY;  Surgeon: Etta Riddle MD;  Location: [facility] ENDOSCOPY  2/21/2017: EGD N/A      Comment: Procedure: ESOPHAGOGASTRODUODENOSCOPY (EGD);                  Surgeon: Judith Gandhi ONCE DAILY IN THE EVENING AT BEDTIME, Disp: 90 tablet, Rfl: 3  •  omeprazole 20 MG Oral Capsule Delayed Release, Take 1 capsule (20 mg total) by mouth 2 (two) times daily before meals. , Disp: 180 capsule, Rfl: 1  •  Losartan Potassium-HCTZ 100-12.5 MG Oral incontinence. Integumentary Normal - No chronic rashes, inflammation, ulcerations or skin changes. Neurologic Normal - No headache, blurred vision, and no areas of focal weakness. Psychiatric Normal - No insomnia, depression, gaviota or mood swings. RBC 4.08 12/29/2017   HGB 12.5 12/29/2017   HCT 38.4 12/29/2017   MCV 94.1 12/29/2017   MCH 30.6 12/29/2017   MCHC 32.6 12/29/2017   RDW 14.1 12/29/2017   .0 12/29/2017       Lab Results  Component Value Date    12/29/2017   K 4.4 12/29/2017 the patient. More than 50% of that time was spent counseling the patient and/or on coordination of care. The diagnosis, prognosis, and general treatment was explained to the patient and the family. Risk Level: High    Electronically Signed by:     Will

## 2017-12-29 NOTE — PROGRESS NOTES
MRI was set up in office. IR said they will call the patient to set up port placement. Asked that it be done by 1/8.

## 2018-01-01 ENCOUNTER — OFFICE VISIT (OUTPATIENT)
Dept: HEMATOLOGY/ONCOLOGY | Age: 83
End: 2018-01-01
Attending: INTERNAL MEDICINE
Payer: MEDICARE

## 2018-01-01 ENCOUNTER — NURSE ONLY (OUTPATIENT)
Dept: HEMATOLOGY/ONCOLOGY | Age: 83
End: 2018-01-01
Attending: INTERNAL MEDICINE
Payer: MEDICARE

## 2018-01-01 ENCOUNTER — SNF/IP PROF CHARGE ONLY (OUTPATIENT)
Dept: HEMATOLOGY/ONCOLOGY | Facility: HOSPITAL | Age: 83
End: 2018-01-01

## 2018-01-01 ENCOUNTER — HOSPITAL ENCOUNTER (OUTPATIENT)
Dept: CT IMAGING | Age: 83
Discharge: HOME | End: 2018-01-01
Attending: INTERNAL MEDICINE
Payer: MEDICARE

## 2018-01-01 ENCOUNTER — HOSPITAL ENCOUNTER (INPATIENT)
Facility: HOSPITAL | Age: 83
LOS: 3 days | Discharge: HOME | DRG: 377 | End: 2018-01-01
Attending: EMERGENCY MEDICINE | Admitting: HOSPITALIST
Payer: MEDICARE

## 2018-01-01 ENCOUNTER — HOSPITAL ENCOUNTER (OUTPATIENT)
Dept: RADIATION ONCOLOGY | Age: 83
Discharge: HOME | End: 2018-01-01
Attending: RADIOLOGY
Payer: MEDICARE

## 2018-01-01 ENCOUNTER — APPOINTMENT (OUTPATIENT)
Dept: GENERAL RADIOLOGY | Facility: HOSPITAL | Age: 83
DRG: 313 | End: 2018-01-01
Attending: EMERGENCY MEDICINE
Payer: MEDICARE

## 2018-01-01 ENCOUNTER — PATIENT MESSAGE (OUTPATIENT)
Dept: CASE MANAGEMENT | Age: 83
End: 2018-01-01

## 2018-01-01 ENCOUNTER — APPOINTMENT (OUTPATIENT)
Dept: HEMATOLOGY/ONCOLOGY | Age: 83
End: 2018-01-01
Attending: INTERNAL MEDICINE
Payer: MEDICARE

## 2018-01-01 ENCOUNTER — APPOINTMENT (OUTPATIENT)
Dept: CT IMAGING | Facility: HOSPITAL | Age: 83
DRG: 313 | End: 2018-01-01
Attending: EMERGENCY MEDICINE
Payer: MEDICARE

## 2018-01-01 ENCOUNTER — TELEPHONE (OUTPATIENT)
Dept: HEMATOLOGY/ONCOLOGY | Facility: HOSPITAL | Age: 83
End: 2018-01-01

## 2018-01-01 ENCOUNTER — TELEPHONE (OUTPATIENT)
Dept: FAMILY MEDICINE CLINIC | Facility: CLINIC | Age: 83
End: 2018-01-01

## 2018-01-01 ENCOUNTER — OFFICE VISIT (OUTPATIENT)
Dept: FAMILY MEDICINE CLINIC | Facility: CLINIC | Age: 83
End: 2018-01-01

## 2018-01-01 ENCOUNTER — OFFICE VISIT (OUTPATIENT)
Dept: FAMILY MEDICINE CLINIC | Facility: CLINIC | Age: 83
End: 2018-01-01
Payer: MEDICARE

## 2018-01-01 ENCOUNTER — ANESTHESIA (OUTPATIENT)
Dept: ENDOSCOPY | Facility: HOSPITAL | Age: 83
DRG: 377 | End: 2018-01-01
Payer: MEDICARE

## 2018-01-01 ENCOUNTER — HOSPITAL ENCOUNTER (OUTPATIENT)
Dept: NUCLEAR MEDICINE | Facility: HOSPITAL | Age: 83
Discharge: HOME | End: 2018-01-01
Attending: INTERNAL MEDICINE
Payer: MEDICARE

## 2018-01-01 ENCOUNTER — HOSPITAL ENCOUNTER (OUTPATIENT)
Dept: MRI IMAGING | Age: 83
Discharge: HOME | End: 2018-01-01
Attending: INTERNAL MEDICINE
Payer: MEDICARE

## 2018-01-01 ENCOUNTER — APPOINTMENT (OUTPATIENT)
Dept: GENERAL RADIOLOGY | Facility: HOSPITAL | Age: 83
DRG: 377 | End: 2018-01-01
Attending: INTERNAL MEDICINE
Payer: MEDICARE

## 2018-01-01 ENCOUNTER — APPOINTMENT (OUTPATIENT)
Dept: GENERAL RADIOLOGY | Facility: HOSPITAL | Age: 83
DRG: 377 | End: 2018-01-01
Attending: NURSE PRACTITIONER
Payer: MEDICARE

## 2018-01-01 ENCOUNTER — APPOINTMENT (OUTPATIENT)
Dept: HEMATOLOGY/ONCOLOGY | Age: 83
End: 2018-01-01
Attending: NURSE PRACTITIONER
Payer: MEDICARE

## 2018-01-01 ENCOUNTER — PATIENT OUTREACH (OUTPATIENT)
Dept: CASE MANAGEMENT | Age: 83
End: 2018-01-01

## 2018-01-01 ENCOUNTER — NURSE ONLY (OUTPATIENT)
Dept: HEMATOLOGY/ONCOLOGY | Age: 83
End: 2018-01-01
Attending: RADIOLOGY
Payer: MEDICARE

## 2018-01-01 ENCOUNTER — APPOINTMENT (OUTPATIENT)
Dept: CV DIAGNOSTICS | Facility: HOSPITAL | Age: 83
DRG: 313 | End: 2018-01-01
Attending: HOSPITALIST
Payer: MEDICARE

## 2018-01-01 ENCOUNTER — HOSPITAL ENCOUNTER (OUTPATIENT)
Dept: RADIATION ONCOLOGY | Age: 83
End: 2018-01-01
Attending: RADIOLOGY
Payer: MEDICARE

## 2018-01-01 ENCOUNTER — MEDICAL CORRESPONDENCE (OUTPATIENT)
Dept: RADIATION ONCOLOGY | Age: 83
End: 2018-01-01

## 2018-01-01 ENCOUNTER — SURGERY (OUTPATIENT)
Age: 83
End: 2018-01-01

## 2018-01-01 ENCOUNTER — ANESTHESIA EVENT (OUTPATIENT)
Dept: ENDOSCOPY | Facility: HOSPITAL | Age: 83
DRG: 377 | End: 2018-01-01
Payer: MEDICARE

## 2018-01-01 ENCOUNTER — DIETICIAN VISIT (OUTPATIENT)
Dept: HEMATOLOGY/ONCOLOGY | Facility: HOSPITAL | Age: 83
End: 2018-01-01

## 2018-01-01 ENCOUNTER — HOSPITAL ENCOUNTER (INPATIENT)
Facility: HOSPITAL | Age: 83
LOS: 1 days | Discharge: HOME | DRG: 313 | End: 2018-01-01
Attending: EMERGENCY MEDICINE | Admitting: HOSPITALIST
Payer: MEDICARE

## 2018-01-01 ENCOUNTER — OFFICE VISIT (OUTPATIENT)
Dept: HEMATOLOGY/ONCOLOGY | Age: 83
End: 2018-01-01
Attending: NURSE PRACTITIONER
Payer: MEDICARE

## 2018-01-01 ENCOUNTER — OFFICE VISIT (OUTPATIENT)
Dept: HEMATOLOGY/ONCOLOGY | Age: 83
End: 2018-01-01
Attending: RADIOLOGY
Payer: MEDICARE

## 2018-01-01 ENCOUNTER — HOSPITAL ENCOUNTER (OUTPATIENT)
Dept: INTERVENTIONAL RADIOLOGY/VASCULAR | Facility: HOSPITAL | Age: 83
Discharge: HOME | End: 2018-01-01
Attending: INTERNAL MEDICINE | Admitting: INTERNAL MEDICINE
Payer: MEDICARE

## 2018-01-01 VITALS
BODY MASS INDEX: 32.11 KG/M2 | HEIGHT: 63.39 IN | RESPIRATION RATE: 18 BRPM | WEIGHT: 183.5 LBS | DIASTOLIC BLOOD PRESSURE: 72 MMHG | SYSTOLIC BLOOD PRESSURE: 158 MMHG | OXYGEN SATURATION: 98 % | TEMPERATURE: 98 F | HEART RATE: 76 BPM

## 2018-01-01 VITALS
OXYGEN SATURATION: 100 % | TEMPERATURE: 97 F | SYSTOLIC BLOOD PRESSURE: 155 MMHG | HEART RATE: 76 BPM | DIASTOLIC BLOOD PRESSURE: 81 MMHG | BODY MASS INDEX: 36 KG/M2 | WEIGHT: 205.31 LBS | RESPIRATION RATE: 18 BRPM

## 2018-01-01 VITALS
HEART RATE: 84 BPM | RESPIRATION RATE: 18 BRPM | DIASTOLIC BLOOD PRESSURE: 44 MMHG | TEMPERATURE: 98 F | OXYGEN SATURATION: 93 % | BODY MASS INDEX: 34.15 KG/M2 | WEIGHT: 200 LBS | SYSTOLIC BLOOD PRESSURE: 119 MMHG | HEIGHT: 64 IN

## 2018-01-01 VITALS
HEART RATE: 77 BPM | RESPIRATION RATE: 18 BRPM | SYSTOLIC BLOOD PRESSURE: 135 MMHG | TEMPERATURE: 97 F | DIASTOLIC BLOOD PRESSURE: 62 MMHG | BODY MASS INDEX: 36 KG/M2 | WEIGHT: 206.19 LBS | OXYGEN SATURATION: 95 %

## 2018-01-01 VITALS
BODY MASS INDEX: 34.15 KG/M2 | DIASTOLIC BLOOD PRESSURE: 63 MMHG | WEIGHT: 200 LBS | RESPIRATION RATE: 20 BRPM | HEIGHT: 64.02 IN | OXYGEN SATURATION: 91 % | TEMPERATURE: 98 F | HEART RATE: 68 BPM | SYSTOLIC BLOOD PRESSURE: 129 MMHG

## 2018-01-01 VITALS
HEART RATE: 70 BPM | DIASTOLIC BLOOD PRESSURE: 60 MMHG | TEMPERATURE: 98 F | OXYGEN SATURATION: 95 % | SYSTOLIC BLOOD PRESSURE: 132 MMHG | RESPIRATION RATE: 18 BRPM

## 2018-01-01 VITALS
HEART RATE: 78 BPM | DIASTOLIC BLOOD PRESSURE: 64 MMHG | TEMPERATURE: 98 F | BODY MASS INDEX: 34 KG/M2 | SYSTOLIC BLOOD PRESSURE: 116 MMHG | OXYGEN SATURATION: 97 % | WEIGHT: 200.38 LBS | RESPIRATION RATE: 18 BRPM

## 2018-01-01 VITALS
BODY MASS INDEX: 35 KG/M2 | SYSTOLIC BLOOD PRESSURE: 136 MMHG | RESPIRATION RATE: 20 BRPM | HEART RATE: 74 BPM | TEMPERATURE: 98 F | DIASTOLIC BLOOD PRESSURE: 69 MMHG | OXYGEN SATURATION: 100 % | WEIGHT: 197.19 LBS

## 2018-01-01 VITALS
DIASTOLIC BLOOD PRESSURE: 75 MMHG | HEART RATE: 75 BPM | WEIGHT: 196.13 LBS | OXYGEN SATURATION: 98 % | BODY MASS INDEX: 34 KG/M2 | TEMPERATURE: 98 F | SYSTOLIC BLOOD PRESSURE: 144 MMHG | RESPIRATION RATE: 18 BRPM

## 2018-01-01 VITALS
OXYGEN SATURATION: 97 % | TEMPERATURE: 97 F | WEIGHT: 201.88 LBS | HEART RATE: 73 BPM | SYSTOLIC BLOOD PRESSURE: 136 MMHG | BODY MASS INDEX: 35 KG/M2 | DIASTOLIC BLOOD PRESSURE: 63 MMHG | RESPIRATION RATE: 18 BRPM

## 2018-01-01 VITALS
RESPIRATION RATE: 18 BRPM | OXYGEN SATURATION: 98 % | TEMPERATURE: 98 F | HEART RATE: 74 BPM | DIASTOLIC BLOOD PRESSURE: 77 MMHG | SYSTOLIC BLOOD PRESSURE: 146 MMHG

## 2018-01-01 VITALS
SYSTOLIC BLOOD PRESSURE: 110 MMHG | RESPIRATION RATE: 18 BRPM | OXYGEN SATURATION: 93 % | TEMPERATURE: 97 F | DIASTOLIC BLOOD PRESSURE: 63 MMHG | WEIGHT: 192.63 LBS | HEART RATE: 88 BPM | BODY MASS INDEX: 33 KG/M2

## 2018-01-01 VITALS
RESPIRATION RATE: 18 BRPM | WEIGHT: 192.19 LBS | SYSTOLIC BLOOD PRESSURE: 89 MMHG | BODY MASS INDEX: 33 KG/M2 | HEART RATE: 104 BPM | DIASTOLIC BLOOD PRESSURE: 53 MMHG | TEMPERATURE: 97 F | OXYGEN SATURATION: 93 %

## 2018-01-01 VITALS
WEIGHT: 192 LBS | TEMPERATURE: 98 F | RESPIRATION RATE: 20 BRPM | OXYGEN SATURATION: 96 % | DIASTOLIC BLOOD PRESSURE: 60 MMHG | HEART RATE: 71 BPM | BODY MASS INDEX: 32.78 KG/M2 | HEIGHT: 64 IN | SYSTOLIC BLOOD PRESSURE: 134 MMHG

## 2018-01-01 VITALS
TEMPERATURE: 98 F | DIASTOLIC BLOOD PRESSURE: 63 MMHG | WEIGHT: 200.69 LBS | SYSTOLIC BLOOD PRESSURE: 133 MMHG | OXYGEN SATURATION: 99 % | HEART RATE: 75 BPM | BODY MASS INDEX: 35 KG/M2 | RESPIRATION RATE: 18 BRPM

## 2018-01-01 VITALS
DIASTOLIC BLOOD PRESSURE: 70 MMHG | WEIGHT: 194.19 LBS | OXYGEN SATURATION: 97 % | DIASTOLIC BLOOD PRESSURE: 73 MMHG | SYSTOLIC BLOOD PRESSURE: 154 MMHG | HEART RATE: 85 BPM | OXYGEN SATURATION: 98 % | WEIGHT: 197.69 LBS | BODY MASS INDEX: 34 KG/M2 | RESPIRATION RATE: 18 BRPM | TEMPERATURE: 99 F | BODY MASS INDEX: 34 KG/M2 | TEMPERATURE: 97 F | SYSTOLIC BLOOD PRESSURE: 129 MMHG | HEART RATE: 79 BPM | RESPIRATION RATE: 18 BRPM

## 2018-01-01 VITALS
BODY MASS INDEX: 33.24 KG/M2 | HEART RATE: 85 BPM | SYSTOLIC BLOOD PRESSURE: 127 MMHG | WEIGHT: 194.69 LBS | TEMPERATURE: 98 F | OXYGEN SATURATION: 97 % | RESPIRATION RATE: 24 BRPM | HEIGHT: 64 IN | DIASTOLIC BLOOD PRESSURE: 65 MMHG

## 2018-01-01 VITALS
RESPIRATION RATE: 18 BRPM | RESPIRATION RATE: 18 BRPM | SYSTOLIC BLOOD PRESSURE: 141 MMHG | OXYGEN SATURATION: 95 % | TEMPERATURE: 97 F | BODY MASS INDEX: 35.29 KG/M2 | HEART RATE: 71 BPM | WEIGHT: 201.69 LBS | HEIGHT: 63.39 IN | OXYGEN SATURATION: 98 % | BODY MASS INDEX: 34 KG/M2 | WEIGHT: 197.5 LBS | DIASTOLIC BLOOD PRESSURE: 66 MMHG | HEART RATE: 71 BPM | TEMPERATURE: 98 F | SYSTOLIC BLOOD PRESSURE: 136 MMHG | DIASTOLIC BLOOD PRESSURE: 68 MMHG

## 2018-01-01 VITALS
BODY MASS INDEX: 33.63 KG/M2 | RESPIRATION RATE: 86 BRPM | WEIGHT: 197 LBS | HEART RATE: 70 BPM | SYSTOLIC BLOOD PRESSURE: 120 MMHG | HEIGHT: 64 IN | DIASTOLIC BLOOD PRESSURE: 70 MMHG | TEMPERATURE: 97 F

## 2018-01-01 VITALS
HEART RATE: 68 BPM | SYSTOLIC BLOOD PRESSURE: 122 MMHG | BODY MASS INDEX: 34 KG/M2 | DIASTOLIC BLOOD PRESSURE: 70 MMHG | WEIGHT: 197 LBS

## 2018-01-01 VITALS
SYSTOLIC BLOOD PRESSURE: 134 MMHG | BODY MASS INDEX: 34.71 KG/M2 | TEMPERATURE: 98 F | HEIGHT: 63.39 IN | OXYGEN SATURATION: 98 % | WEIGHT: 198.38 LBS | DIASTOLIC BLOOD PRESSURE: 78 MMHG | HEART RATE: 71 BPM | RESPIRATION RATE: 20 BRPM

## 2018-01-01 VITALS
SYSTOLIC BLOOD PRESSURE: 131 MMHG | WEIGHT: 206.81 LBS | DIASTOLIC BLOOD PRESSURE: 70 MMHG | HEART RATE: 90 BPM | RESPIRATION RATE: 18 BRPM | BODY MASS INDEX: 36 KG/M2 | TEMPERATURE: 98 F

## 2018-01-01 VITALS
BODY MASS INDEX: 35.75 KG/M2 | WEIGHT: 204.31 LBS | DIASTOLIC BLOOD PRESSURE: 73 MMHG | TEMPERATURE: 98 F | HEIGHT: 63.39 IN | HEART RATE: 89 BPM | RESPIRATION RATE: 18 BRPM | SYSTOLIC BLOOD PRESSURE: 147 MMHG | OXYGEN SATURATION: 94 %

## 2018-01-01 VITALS
TEMPERATURE: 97 F | OXYGEN SATURATION: 94 % | DIASTOLIC BLOOD PRESSURE: 76 MMHG | RESPIRATION RATE: 16 BRPM | HEART RATE: 82 BPM | SYSTOLIC BLOOD PRESSURE: 136 MMHG

## 2018-01-01 VITALS
RESPIRATION RATE: 18 BRPM | SYSTOLIC BLOOD PRESSURE: 115 MMHG | TEMPERATURE: 99 F | BODY MASS INDEX: 35 KG/M2 | OXYGEN SATURATION: 97 % | WEIGHT: 198.88 LBS | DIASTOLIC BLOOD PRESSURE: 69 MMHG | HEART RATE: 71 BPM

## 2018-01-01 VITALS
RESPIRATION RATE: 18 BRPM | TEMPERATURE: 98 F | WEIGHT: 198 LBS | DIASTOLIC BLOOD PRESSURE: 60 MMHG | BODY MASS INDEX: 34.65 KG/M2 | OXYGEN SATURATION: 98 % | HEIGHT: 63.39 IN | HEART RATE: 77 BPM | SYSTOLIC BLOOD PRESSURE: 125 MMHG

## 2018-01-01 VITALS
SYSTOLIC BLOOD PRESSURE: 149 MMHG | RESPIRATION RATE: 20 BRPM | WEIGHT: 200 LBS | TEMPERATURE: 98 F | DIASTOLIC BLOOD PRESSURE: 76 MMHG | BODY MASS INDEX: 34 KG/M2 | HEART RATE: 81 BPM | OXYGEN SATURATION: 96 %

## 2018-01-01 VITALS
OXYGEN SATURATION: 97 % | BODY MASS INDEX: 34 KG/M2 | RESPIRATION RATE: 20 BRPM | WEIGHT: 198.38 LBS | HEART RATE: 72 BPM | DIASTOLIC BLOOD PRESSURE: 69 MMHG | SYSTOLIC BLOOD PRESSURE: 130 MMHG | TEMPERATURE: 98 F

## 2018-01-01 VITALS
HEIGHT: 64 IN | SYSTOLIC BLOOD PRESSURE: 157 MMHG | WEIGHT: 197.31 LBS | BODY MASS INDEX: 33.69 KG/M2 | RESPIRATION RATE: 16 BRPM | HEART RATE: 82 BPM | DIASTOLIC BLOOD PRESSURE: 97 MMHG | OXYGEN SATURATION: 97 % | TEMPERATURE: 98 F

## 2018-01-01 VITALS
SYSTOLIC BLOOD PRESSURE: 133 MMHG | TEMPERATURE: 98 F | RESPIRATION RATE: 18 BRPM | BODY MASS INDEX: 35 KG/M2 | WEIGHT: 201.19 LBS | HEART RATE: 79 BPM | OXYGEN SATURATION: 97 % | DIASTOLIC BLOOD PRESSURE: 66 MMHG

## 2018-01-01 VITALS — HEART RATE: 69 BPM | SYSTOLIC BLOOD PRESSURE: 113 MMHG | DIASTOLIC BLOOD PRESSURE: 68 MMHG

## 2018-01-01 VITALS
RESPIRATION RATE: 20 BRPM | HEART RATE: 76 BPM | DIASTOLIC BLOOD PRESSURE: 59 MMHG | SYSTOLIC BLOOD PRESSURE: 113 MMHG | OXYGEN SATURATION: 92 % | TEMPERATURE: 97 F

## 2018-01-01 VITALS
WEIGHT: 201.69 LBS | DIASTOLIC BLOOD PRESSURE: 68 MMHG | HEIGHT: 63.39 IN | WEIGHT: 197.88 LBS | TEMPERATURE: 98 F | OXYGEN SATURATION: 96 % | TEMPERATURE: 98 F | HEART RATE: 67 BPM | RESPIRATION RATE: 18 BRPM | HEART RATE: 73 BPM | OXYGEN SATURATION: 94 % | BODY MASS INDEX: 35.29 KG/M2 | DIASTOLIC BLOOD PRESSURE: 59 MMHG | BODY MASS INDEX: 35 KG/M2 | SYSTOLIC BLOOD PRESSURE: 134 MMHG | RESPIRATION RATE: 20 BRPM | SYSTOLIC BLOOD PRESSURE: 142 MMHG

## 2018-01-01 VITALS
OXYGEN SATURATION: 96 % | SYSTOLIC BLOOD PRESSURE: 130 MMHG | WEIGHT: 202.88 LBS | BODY MASS INDEX: 36 KG/M2 | DIASTOLIC BLOOD PRESSURE: 69 MMHG | HEART RATE: 85 BPM | RESPIRATION RATE: 18 BRPM | TEMPERATURE: 97 F

## 2018-01-01 VITALS
HEART RATE: 70 BPM | OXYGEN SATURATION: 96 % | TEMPERATURE: 98 F | RESPIRATION RATE: 18 BRPM | HEIGHT: 63.39 IN | DIASTOLIC BLOOD PRESSURE: 75 MMHG | SYSTOLIC BLOOD PRESSURE: 126 MMHG | WEIGHT: 183 LBS | BODY MASS INDEX: 32.02 KG/M2

## 2018-01-01 VITALS
RESPIRATION RATE: 20 BRPM | HEART RATE: 75 BPM | HEIGHT: 64 IN | WEIGHT: 189 LBS | OXYGEN SATURATION: 98 % | TEMPERATURE: 98 F | SYSTOLIC BLOOD PRESSURE: 124 MMHG | BODY MASS INDEX: 32.27 KG/M2 | DIASTOLIC BLOOD PRESSURE: 60 MMHG

## 2018-01-01 DIAGNOSIS — D64.9 ANEMIA, UNSPECIFIED TYPE: ICD-10-CM

## 2018-01-01 DIAGNOSIS — C34.90 SMALL CELL LUNG CANCER, UNSPECIFIED LATERALITY (HCC): ICD-10-CM

## 2018-01-01 DIAGNOSIS — R79.89 ELEVATED SERUM CREATININE: Primary | ICD-10-CM

## 2018-01-01 DIAGNOSIS — C34.90 SMALL CELL LUNG CANCER IN ADULT (HCC): Primary | ICD-10-CM

## 2018-01-01 DIAGNOSIS — C61 MALIGNANT NEOPLASM OF PROSTATE (HCC): ICD-10-CM

## 2018-01-01 DIAGNOSIS — C34.92 SMALL CELL CARCINOMA OF LEFT LUNG (HCC): ICD-10-CM

## 2018-01-01 DIAGNOSIS — D63.0 ANEMIA COMPLICATING NEOPLASTIC DISEASE: ICD-10-CM

## 2018-01-01 DIAGNOSIS — D69.59 CHEMOTHERAPY-INDUCED THROMBOCYTOPENIA: ICD-10-CM

## 2018-01-01 DIAGNOSIS — T66.XXXS RADIATION EFFECT, SEQUELA: Primary | ICD-10-CM

## 2018-01-01 DIAGNOSIS — C34.90 SMALL CELL LUNG CANCER, UNSPECIFIED LATERALITY (HCC): Primary | ICD-10-CM

## 2018-01-01 DIAGNOSIS — D64.81 ANEMIA ASSOCIATED WITH CHEMOTHERAPY: ICD-10-CM

## 2018-01-01 DIAGNOSIS — Z71.89 GOALS OF CARE, COUNSELING/DISCUSSION: ICD-10-CM

## 2018-01-01 DIAGNOSIS — R79.89 ELEVATED SERUM CREATININE: ICD-10-CM

## 2018-01-01 DIAGNOSIS — N18.30 STAGE 3 CHRONIC KIDNEY DISEASE (HCC): Primary | Chronic | ICD-10-CM

## 2018-01-01 DIAGNOSIS — I95.9 HYPOTENSION, UNSPECIFIED HYPOTENSION TYPE: ICD-10-CM

## 2018-01-01 DIAGNOSIS — M15.9 PRIMARY OSTEOARTHRITIS INVOLVING MULTIPLE JOINTS: ICD-10-CM

## 2018-01-01 DIAGNOSIS — C34.90 SMALL CELL CARCINOMA OF LUNG, UNSPECIFIED LATERALITY: Primary | ICD-10-CM

## 2018-01-01 DIAGNOSIS — I95.9 HYPOTENSION: Primary | ICD-10-CM

## 2018-01-01 DIAGNOSIS — C79.31 BRAIN METASTASES (HCC): ICD-10-CM

## 2018-01-01 DIAGNOSIS — I10 ESSENTIAL HYPERTENSION: ICD-10-CM

## 2018-01-01 DIAGNOSIS — T45.1X5A CHEMOTHERAPY INDUCED NAUSEA AND VOMITING: Primary | ICD-10-CM

## 2018-01-01 DIAGNOSIS — M1A.9XX0 CHRONIC GOUT WITHOUT TOPHUS, UNSPECIFIED CAUSE, UNSPECIFIED SITE: ICD-10-CM

## 2018-01-01 DIAGNOSIS — R07.9 ACUTE CHEST PAIN: Primary | ICD-10-CM

## 2018-01-01 DIAGNOSIS — N18.4 CKD (CHRONIC KIDNEY DISEASE) STAGE 4, GFR 15-29 ML/MIN (HCC): ICD-10-CM

## 2018-01-01 DIAGNOSIS — D63.0 ANEMIA IN NEOPLASTIC DISEASE: ICD-10-CM

## 2018-01-01 DIAGNOSIS — R60.9 FLUID RETENTION: ICD-10-CM

## 2018-01-01 DIAGNOSIS — Z71.89 COUNSELING REGARDING END OF LIFE DECISION MAKING: ICD-10-CM

## 2018-01-01 DIAGNOSIS — C79.31 BRAIN METASTASES (HCC): Primary | ICD-10-CM

## 2018-01-01 DIAGNOSIS — C79.31 BRAIN METASTASIS (HCC): ICD-10-CM

## 2018-01-01 DIAGNOSIS — T45.1X5A ANEMIA ASSOCIATED WITH CHEMOTHERAPY: ICD-10-CM

## 2018-01-01 DIAGNOSIS — R00.0 TACHYCARDIA: ICD-10-CM

## 2018-01-01 DIAGNOSIS — R77.8 ELEVATED TROPONIN: ICD-10-CM

## 2018-01-01 DIAGNOSIS — T45.1X5A CHEMOTHERAPY-INDUCED THROMBOCYTOPENIA: ICD-10-CM

## 2018-01-01 DIAGNOSIS — E87.1 HYPONATREMIA: ICD-10-CM

## 2018-01-01 DIAGNOSIS — C34.90 SMALL CELL LUNG CANCER (HCC): ICD-10-CM

## 2018-01-01 DIAGNOSIS — I95.2 HYPOTENSION DUE TO DRUGS: ICD-10-CM

## 2018-01-01 DIAGNOSIS — R71.8 ELEVATED MCV: ICD-10-CM

## 2018-01-01 DIAGNOSIS — C34.90 SCLC (SMALL CELL LUNG CARCINOMA) (HCC): Primary | ICD-10-CM

## 2018-01-01 DIAGNOSIS — D72.819 WHITE BLOOD CELLS (WBC) DECREASED BY CHEMOTHERAPY OR MEDICATION: ICD-10-CM

## 2018-01-01 DIAGNOSIS — C34.91 SMALL CELL CARCINOMA OF RIGHT LUNG (HCC): Primary | ICD-10-CM

## 2018-01-01 DIAGNOSIS — Z02.9 ENCOUNTERS FOR ADMINISTRATIVE PURPOSE: ICD-10-CM

## 2018-01-01 DIAGNOSIS — R53.82 CHRONIC FATIGUE: ICD-10-CM

## 2018-01-01 DIAGNOSIS — C34.90 SMALL CELL LUNG CANCER IN ADULT (HCC): ICD-10-CM

## 2018-01-01 DIAGNOSIS — T45.1X5A CHEMOTHERAPY-INDUCED NEUTROPENIA (HCC): ICD-10-CM

## 2018-01-01 DIAGNOSIS — Z71.9 ENCOUNTER FOR EDUCATION: ICD-10-CM

## 2018-01-01 DIAGNOSIS — C34.90 SMALL CELL LUNG CANCER (HCC): Primary | ICD-10-CM

## 2018-01-01 DIAGNOSIS — R07.89 OTHER CHEST PAIN: ICD-10-CM

## 2018-01-01 DIAGNOSIS — D70.1 CHEMOTHERAPY INDUCED NEUTROPENIA (HCC): ICD-10-CM

## 2018-01-01 DIAGNOSIS — R53.83 TIRED: Primary | ICD-10-CM

## 2018-01-01 DIAGNOSIS — R07.89 ATYPICAL CHEST PAIN: Primary | ICD-10-CM

## 2018-01-01 DIAGNOSIS — D70.1 CHEMOTHERAPY-INDUCED NEUTROPENIA (HCC): ICD-10-CM

## 2018-01-01 DIAGNOSIS — R11.2 CHEMOTHERAPY INDUCED NAUSEA AND VOMITING: Primary | ICD-10-CM

## 2018-01-01 DIAGNOSIS — C34.90 SMALL CELL CARCINOMA OF LUNG (HCC): ICD-10-CM

## 2018-01-01 DIAGNOSIS — G89.29 CHRONIC RIGHT-SIDED LOW BACK PAIN WITHOUT SCIATICA: ICD-10-CM

## 2018-01-01 DIAGNOSIS — E86.0 DEHYDRATION: ICD-10-CM

## 2018-01-01 DIAGNOSIS — K21.00 GASTROESOPHAGEAL REFLUX DISEASE WITH ESOPHAGITIS: ICD-10-CM

## 2018-01-01 DIAGNOSIS — D62 ANEMIA DUE TO ACUTE BLOOD LOSS: ICD-10-CM

## 2018-01-01 DIAGNOSIS — E27.8 ADRENAL NODULE (HCC): ICD-10-CM

## 2018-01-01 DIAGNOSIS — R60.0 LOCALIZED EDEMA: ICD-10-CM

## 2018-01-01 DIAGNOSIS — D62 ACUTE BLOOD LOSS ANEMIA: ICD-10-CM

## 2018-01-01 DIAGNOSIS — T45.1X5A CHEMOTHERAPY INDUCED NEUTROPENIA (HCC): ICD-10-CM

## 2018-01-01 DIAGNOSIS — R52 PAIN: ICD-10-CM

## 2018-01-01 DIAGNOSIS — C61 MALIGNANT NEOPLASM OF PROSTATE (HCC): Primary | ICD-10-CM

## 2018-01-01 DIAGNOSIS — R05.3 CHRONIC COUGH: ICD-10-CM

## 2018-01-01 DIAGNOSIS — R53.83 OTHER FATIGUE: ICD-10-CM

## 2018-01-01 DIAGNOSIS — N18.30 CHRONIC KIDNEY DISEASE, STAGE 3 (HCC): ICD-10-CM

## 2018-01-01 DIAGNOSIS — K92.2 GASTROINTESTINAL HEMORRHAGE, UNSPECIFIED GASTROINTESTINAL HEMORRHAGE TYPE: Primary | ICD-10-CM

## 2018-01-01 DIAGNOSIS — M54.50 CHRONIC RIGHT-SIDED LOW BACK PAIN WITHOUT SCIATICA: ICD-10-CM

## 2018-01-01 DIAGNOSIS — J31.0 CHRONIC RHINITIS: ICD-10-CM

## 2018-01-01 DIAGNOSIS — E11.9 CONTROLLED TYPE 2 DIABETES MELLITUS WITHOUT COMPLICATION, WITHOUT LONG-TERM CURRENT USE OF INSULIN (HCC): Primary | ICD-10-CM

## 2018-01-01 DIAGNOSIS — D69.6 THROMBOCYTOPENIA (HCC): ICD-10-CM

## 2018-01-01 DIAGNOSIS — R06.02 SHORTNESS OF BREATH: ICD-10-CM

## 2018-01-01 DIAGNOSIS — J68.3 REACTIVE AIRWAYS DYSFUNCTION SYNDROME (HCC): Primary | ICD-10-CM

## 2018-01-01 LAB
ALBUMIN SERPL-MCNC: 2.8 G/DL (ref 3.5–4.8)
ALBUMIN SERPL-MCNC: 2.9 G/DL (ref 3.5–4.8)
ALBUMIN SERPL-MCNC: 2.9 G/DL (ref 3.5–4.8)
ALBUMIN SERPL-MCNC: 3 G/DL (ref 3.5–4.8)
ALBUMIN SERPL-MCNC: 3 G/DL (ref 3.5–4.8)
ALBUMIN SERPL-MCNC: 3.1 G/DL (ref 3.5–4.8)
ALBUMIN SERPL-MCNC: 3.3 G/DL (ref 3.5–4.8)
ALBUMIN/GLOB SERPL: 0.7 {RATIO} (ref 1–2)
ALP LIVER SERPL-CCNC: 103 U/L (ref 45–117)
ALP LIVER SERPL-CCNC: 104 U/L (ref 45–117)
ALP LIVER SERPL-CCNC: 105 U/L (ref 45–117)
ALP LIVER SERPL-CCNC: 121 U/L (ref 45–117)
ALP LIVER SERPL-CCNC: 81 U/L (ref 45–117)
ALP LIVER SERPL-CCNC: 94 U/L (ref 45–117)
ALP LIVER SERPL-CCNC: 98 U/L (ref 45–117)
ALT SERPL-CCNC: 17 U/L (ref 17–63)
ALT SERPL-CCNC: 20 U/L (ref 17–63)
ALT SERPL-CCNC: 23 U/L (ref 17–63)
ALT SERPL-CCNC: 23 U/L (ref 17–63)
ALT SERPL-CCNC: 26 U/L (ref 17–63)
ALT SERPL-CCNC: 30 U/L (ref 17–63)
ALT SERPL-CCNC: 55 U/L (ref 17–63)
ANION GAP SERPL CALC-SCNC: 12 MMOL/L (ref 0–18)
ANTIBODY SCREEN: NEGATIVE
AST SERPL-CCNC: 16 U/L (ref 15–41)
AST SERPL-CCNC: 17 U/L (ref 15–41)
AST SERPL-CCNC: 17 U/L (ref 15–41)
AST SERPL-CCNC: 18 U/L (ref 15–41)
AST SERPL-CCNC: 21 U/L (ref 15–41)
AST SERPL-CCNC: 21 U/L (ref 15–41)
AST SERPL-CCNC: 27 U/L (ref 15–41)
ATRIAL RATE: 78 BPM
ATRIAL RATE: 94 BPM
BAND %: 3 %
BAND %: 5 %
BASOPHIL % MANUAL: 0 %
BASOPHIL ABSOLUTE MANUAL: 0 X10(3) UL (ref 0–0.1)
BASOPHILS # BLD AUTO: 0.01 X10(3) UL (ref 0–0.1)
BASOPHILS # BLD AUTO: 0.02 X10(3) UL (ref 0–0.1)
BASOPHILS # BLD AUTO: 0.02 X10(3) UL (ref 0–0.1)
BASOPHILS # BLD AUTO: 0.05 X10(3) UL (ref 0–0.1)
BASOPHILS NFR BLD AUTO: 0.2 %
BASOPHILS NFR BLD AUTO: 0.4 %
BASOPHILS NFR BLD AUTO: 0.5 %
BASOPHILS NFR BLD AUTO: 0.7 %
BILIRUB SERPL-MCNC: 0.2 MG/DL (ref 0.1–2)
BILIRUB SERPL-MCNC: 0.3 MG/DL (ref 0.1–2)
BILIRUB SERPL-MCNC: 0.4 MG/DL (ref 0.1–2)
BILIRUB SERPL-MCNC: 0.4 MG/DL (ref 0.1–2)
BUN BLD-MCNC: 19 MG/DL (ref 8–20)
BUN BLD-MCNC: 22 MG/DL (ref 8–20)
BUN BLD-MCNC: 23 MG/DL (ref 8–20)
BUN BLD-MCNC: 23 MG/DL (ref 8–20)
BUN BLD-MCNC: 26 MG/DL (ref 8–20)
BUN BLD-MCNC: 27 MG/DL (ref 8–20)
BUN BLD-MCNC: 28 MG/DL (ref 8–20)
BUN BLD-MCNC: 31 MG/DL (ref 8–20)
BUN BLD-MCNC: 31 MG/DL (ref 8–20)
BUN/CREAT SERPL: 19.5 (ref 10–20)
CALCIUM BLD-MCNC: 8.2 MG/DL (ref 8.3–10.3)
CALCIUM BLD-MCNC: 8.3 MG/DL (ref 8.3–10.3)
CALCIUM BLD-MCNC: 8.4 MG/DL (ref 8.3–10.3)
CALCIUM BLD-MCNC: 8.5 MG/DL (ref 8.3–10.3)
CALCIUM BLD-MCNC: 8.5 MG/DL (ref 8.3–10.3)
CALCIUM BLD-MCNC: 9 MG/DL (ref 8.3–10.3)
CALCIUM BLD-MCNC: 9 MG/DL (ref 8.3–10.3)
CHLORIDE SERPL-SCNC: 98 MMOL/L (ref 101–111)
CHLORIDE: 100 MMOL/L (ref 101–111)
CHLORIDE: 101 MMOL/L (ref 101–111)
CHLORIDE: 104 MMOL/L (ref 101–111)
CHLORIDE: 95 MMOL/L (ref 101–111)
CHLORIDE: 99 MMOL/L (ref 101–111)
CO2 SERPL-SCNC: 23 MMOL/L (ref 22–32)
CO2: 24 MMOL/L (ref 22–32)
CO2: 25 MMOL/L (ref 22–32)
CO2: 26 MMOL/L (ref 22–32)
CO2: 27 MMOL/L (ref 22–32)
CREAT BLD-MCNC: 1.19 MG/DL (ref 0.7–1.3)
CREAT BLD-MCNC: 1.21 MG/DL (ref 0.7–1.3)
CREAT BLD-MCNC: 1.28 MG/DL (ref 0.7–1.3)
CREAT BLD-MCNC: 1.31 MG/DL (ref 0.7–1.3)
CREAT BLD-MCNC: 1.32 MG/DL (ref 0.7–1.3)
CREAT BLD-MCNC: 1.39 MG/DL (ref 0.7–1.3)
CREAT BLD-MCNC: 1.4 MG/DL (ref 0.7–1.3)
CREAT BLD-MCNC: 1.42 MG/DL (ref 0.7–1.3)
CREAT BLD-MCNC: 1.59 MG/DL (ref 0.7–1.3)
CREAT BLD-MCNC: 1.6 MG/DL (ref 0.7–1.3)
D-DIMER: 1.46 UG/ML FEU (ref 0–0.49)
EOSINOPHIL # BLD AUTO: 0.02 X10(3) UL (ref 0–0.3)
EOSINOPHIL # BLD AUTO: 0.03 X10(3) UL (ref 0–0.3)
EOSINOPHIL # BLD AUTO: 0.05 X10(3) UL (ref 0–0.3)
EOSINOPHIL # BLD AUTO: 0.09 X10(3) UL (ref 0–0.3)
EOSINOPHIL % MANUAL: 0 %
EOSINOPHIL % MANUAL: 1 %
EOSINOPHIL % MANUAL: 2 %
EOSINOPHIL ABSOLUTE MANUAL: 0 X10(3) UL (ref 0–0.3)
EOSINOPHIL ABSOLUTE MANUAL: 0.04 X10(3) UL (ref 0–0.3)
EOSINOPHIL ABSOLUTE MANUAL: 0.16 X10(3) UL (ref 0–0.3)
EOSINOPHIL NFR BLD AUTO: 0.3 %
EOSINOPHIL NFR BLD AUTO: 0.8 %
EOSINOPHIL NFR BLD AUTO: 0.8 %
EOSINOPHIL NFR BLD AUTO: 1.7 %
ERYTHROCYTE [DISTWIDTH] IN BLOOD BY AUTOMATED COUNT: 13.9 % (ref 11.5–16)
ERYTHROCYTE [DISTWIDTH] IN BLOOD BY AUTOMATED COUNT: 14 % (ref 11.5–16)
ERYTHROCYTE [DISTWIDTH] IN BLOOD BY AUTOMATED COUNT: 14.9 % (ref 11.5–16)
ERYTHROCYTE [DISTWIDTH] IN BLOOD BY AUTOMATED COUNT: 15.7 % (ref 11.5–16)
ERYTHROCYTE [DISTWIDTH] IN BLOOD BY AUTOMATED COUNT: 15.9 % (ref 11.5–16)
ERYTHROCYTE [DISTWIDTH] IN BLOOD BY AUTOMATED COUNT: 19.8 % (ref 11.5–16)
ERYTHROCYTE [DISTWIDTH] IN BLOOD BY AUTOMATED COUNT: 20.3 % (ref 11.5–16)
GLOBULIN PLAS-MCNC: 4.6 G/DL (ref 2.5–3.7)
GLUCOSE BLD-MCNC: 113 MG/DL (ref 70–99)
GLUCOSE BLD-MCNC: 115 MG/DL (ref 70–99)
GLUCOSE BLD-MCNC: 119 MG/DL (ref 70–99)
GLUCOSE BLD-MCNC: 123 MG/DL (ref 70–99)
GLUCOSE BLD-MCNC: 130 MG/DL (ref 70–99)
GLUCOSE BLD-MCNC: 139 MG/DL (ref 65–99)
GLUCOSE BLD-MCNC: 140 MG/DL (ref 70–99)
GLUCOSE BLD-MCNC: 144 MG/DL (ref 70–99)
GLUCOSE BLD-MCNC: 155 MG/DL (ref 70–99)
GLUCOSE BLD-MCNC: 169 MG/DL (ref 70–99)
HCT VFR BLD AUTO: 28.4 % (ref 37–53)
HCT VFR BLD AUTO: 29.3 % (ref 37–53)
HCT VFR BLD AUTO: 30.3 % (ref 37–53)
HCT VFR BLD AUTO: 32.4 % (ref 37–53)
HCT VFR BLD AUTO: 33.4 % (ref 37–53)
HCT VFR BLD AUTO: 35.3 % (ref 37–53)
HCT VFR BLD AUTO: 37.4 % (ref 37–53)
HGB BLD-MCNC: 10.5 G/DL (ref 13–17)
HGB BLD-MCNC: 10.6 G/DL (ref 13–17)
HGB BLD-MCNC: 11.3 G/DL (ref 13–17)
HGB BLD-MCNC: 11.9 G/DL (ref 13–17)
HGB BLD-MCNC: 8.9 G/DL (ref 13–17)
HGB BLD-MCNC: 9.5 G/DL (ref 13–17)
HGB BLD-MCNC: 9.7 G/DL (ref 13–17)
IMMATURE GRANULOCYTE COUNT: 0.03 X10(3) UL (ref 0–1)
IMMATURE GRANULOCYTE COUNT: 0.04 X10(3) UL (ref 0–1)
IMMATURE GRANULOCYTE COUNT: 0.08 X10(3) UL (ref 0–1)
IMMATURE GRANULOCYTE COUNT: 0.1 X10(3) UL (ref 0–1)
IMMATURE GRANULOCYTE RATIO %: 0.5 %
IMMATURE GRANULOCYTE RATIO %: 0.8 %
IMMATURE GRANULOCYTE RATIO %: 1 %
IMMATURE GRANULOCYTE RATIO %: 2.6 %
INR: 1 (ref 0.8–1.3)
LARGE PLATELETS: PRESENT
LARGE PLATELETS: PRESENT
LYMPHOCYTE % MANUAL: 19 %
LYMPHOCYTE % MANUAL: 20 %
LYMPHOCYTE % MANUAL: 57 %
LYMPHOCYTE ABSOLUTE MANUAL: 1.58 X10(3) UL (ref 0.9–4)
LYMPHOCYTE ABSOLUTE MANUAL: 2.03 X10(3) UL (ref 0.9–4)
LYMPHOCYTE ABSOLUTE MANUAL: 2.17 X10(3) UL (ref 0.9–4)
LYMPHOCYTES # BLD AUTO: 1.23 X10(3) UL (ref 0.9–4)
LYMPHOCYTES # BLD AUTO: 1.28 X10(3) UL (ref 0.9–4)
LYMPHOCYTES # BLD AUTO: 1.67 X10(3) UL (ref 0.9–4)
LYMPHOCYTES # BLD AUTO: 1.85 X10(3) UL (ref 0.9–4)
LYMPHOCYTES NFR BLD AUTO: 19.6 %
LYMPHOCYTES NFR BLD AUTO: 24.1 %
LYMPHOCYTES NFR BLD AUTO: 24.2 %
LYMPHOCYTES NFR BLD AUTO: 44.2 %
M PROTEIN MFR SERPL ELPH: 7 G/DL (ref 6.1–8.3)
M PROTEIN MFR SERPL ELPH: 7 G/DL (ref 6.1–8.3)
M PROTEIN MFR SERPL ELPH: 7.2 G/DL (ref 6.1–8.3)
M PROTEIN MFR SERPL ELPH: 7.6 G/DL (ref 6.1–8.3)
M PROTEIN MFR SERPL ELPH: 7.7 G/DL (ref 6.1–8.3)
M PROTEIN MFR SERPL ELPH: 7.8 G/DL (ref 6.1–8.3)
M PROTEIN MFR SERPL ELPH: 7.9 G/DL (ref 6.1–8.3)
MCH RBC QN AUTO: 30.4 PG (ref 27–33.2)
MCH RBC QN AUTO: 30.7 PG (ref 27–33.2)
MCH RBC QN AUTO: 30.7 PG (ref 27–33.2)
MCH RBC QN AUTO: 31.2 PG (ref 27–33.2)
MCH RBC QN AUTO: 31.6 PG (ref 27–33.2)
MCH RBC QN AUTO: 31.9 PG (ref 27–33.2)
MCH RBC QN AUTO: 33.7 PG (ref 27–33.2)
MCHC RBC AUTO-ENTMCNC: 31.3 G/DL (ref 31–37)
MCHC RBC AUTO-ENTMCNC: 31.4 G/DL (ref 31–37)
MCHC RBC AUTO-ENTMCNC: 31.7 G/DL (ref 31–37)
MCHC RBC AUTO-ENTMCNC: 31.8 G/DL (ref 31–37)
MCHC RBC AUTO-ENTMCNC: 32 G/DL (ref 31–37)
MCHC RBC AUTO-ENTMCNC: 32.4 G/DL (ref 31–37)
MCHC RBC AUTO-ENTMCNC: 33.1 G/DL (ref 31–37)
MCV RBC AUTO: 100.7 FL (ref 80–99)
MCV RBC AUTO: 101.7 FL (ref 80–99)
MCV RBC AUTO: 103.8 FL (ref 80–99)
MCV RBC AUTO: 94.2 FL (ref 80–99)
MCV RBC AUTO: 95.7 FL (ref 80–99)
MCV RBC AUTO: 95.9 FL (ref 80–99)
MCV RBC AUTO: 96.6 FL (ref 80–99)
METAMYELOCYTE %: 2 %
METAMYELOCYTE ABSOLUTE MANUAL: 0.21 X10(3) UL (ref ?–0.01)
MONOCYTE % MANUAL: 17 %
MONOCYTE % MANUAL: 18 %
MONOCYTE % MANUAL: 7 %
MONOCYTE ABSOLUTE MANUAL: 0.55 X10(3) UL (ref 0.1–0.6)
MONOCYTE ABSOLUTE MANUAL: 0.68 X10(3) UL (ref 0.1–1)
MONOCYTE ABSOLUTE MANUAL: 1.82 X10(3) UL (ref 0.1–1)
MONOCYTES # BLD AUTO: 0.08 X10(3) UL (ref 0.1–0.6)
MONOCYTES # BLD AUTO: 0.45 X10(3) UL (ref 0.1–0.6)
MONOCYTES # BLD AUTO: 0.45 X10(3) UL (ref 0.1–1)
MONOCYTES # BLD AUTO: 0.66 X10(3) UL (ref 0.1–1)
MONOCYTES NFR BLD AUTO: 1.5 %
MONOCYTES NFR BLD AUTO: 11.9 %
MONOCYTES NFR BLD AUTO: 7.2 %
MONOCYTES NFR BLD AUTO: 8.6 %
MORPHOLOGY: NORMAL
MYELOCYTE %: 1 %
MYELOCYTE %: 2 %
MYELOCYTE ABSOLUTE MANUAL: 0.08 X10(3) UL (ref ?–0.01)
MYELOCYTE ABSOLUTE MANUAL: 0.11 X10(3) UL (ref ?–0.01)
NEUTROPHIL ABS PRELIM: 0.78 X10 (3) UL (ref 1.3–6.7)
NEUTROPHIL ABS PRELIM: 1.51 X10 (3) UL (ref 1.3–6.7)
NEUTROPHIL ABS PRELIM: 3.8 X10 (3) UL (ref 1.3–6.7)
NEUTROPHIL ABS PRELIM: 4.49 X10 (3) UL (ref 1.3–6.7)
NEUTROPHIL ABS PRELIM: 4.98 X10 (3) UL (ref 1.3–6.7)
NEUTROPHIL ABS PRELIM: 5.52 X10 (3) UL (ref 1.3–6.7)
NEUTROPHIL ABSOLUTE MANUAL: 0.84 X10(3) UL (ref 1.3–6.7)
NEUTROPHIL ABSOLUTE MANUAL: 5.61 X10(3) UL (ref 1.3–6.7)
NEUTROPHIL ABSOLUTE MANUAL: 6.53 X10(3) UL (ref 1.3–6.7)
NEUTROPHILS # BLD AUTO: 1.51 X10(3) UL (ref 1.3–6.7)
NEUTROPHILS # BLD AUTO: 3.8 X10(3) UL (ref 1.3–6.7)
NEUTROPHILS # BLD AUTO: 4.49 X10(3) UL (ref 1.3–6.7)
NEUTROPHILS # BLD AUTO: 4.98 X10(3) UL (ref 1.3–6.7)
NEUTROPHILS % MANUAL: 19 %
NEUTROPHILS % MANUAL: 56 %
NEUTROPHILS % MANUAL: 71 %
NEUTROPHILS NFR BLD AUTO: 40 %
NEUTROPHILS NFR BLD AUTO: 65.2 %
NEUTROPHILS NFR BLD AUTO: 71.5 %
NEUTROPHILS NFR BLD AUTO: 71.7 %
NRBC CALCULATED: 2
OSMOLALITY SERPL CALC.SUM OF ELEC: 283 MOSM/KG (ref 275–295)
P AXIS: 26 DEGREES
P AXIS: 41 DEGREES
P-R INTERVAL: 182 MS
P-R INTERVAL: 192 MS
PLATELET # BLD AUTO: 143 10(3)UL (ref 150–450)
PLATELET # BLD AUTO: 182 10(3)UL (ref 150–450)
PLATELET # BLD AUTO: 217 10(3)UL (ref 150–450)
PLATELET # BLD AUTO: 237 10(3)UL (ref 150–450)
PLATELET # BLD AUTO: 273 10(3)UL (ref 150–450)
PLATELET # BLD AUTO: 301 10(3)UL (ref 150–450)
PLATELET # BLD AUTO: 303 10(3)UL (ref 150–450)
PLATELET MORPHOLOGY: NORMAL
PLATELET MORPHOLOGY: NORMAL
POTASSIUM SERPL-SCNC: 3.9 MMOL/L (ref 3.6–5.1)
POTASSIUM SERPL-SCNC: 4 MMOL/L (ref 3.6–5.1)
POTASSIUM SERPL-SCNC: 4.1 MMOL/L (ref 3.6–5.1)
POTASSIUM SERPL-SCNC: 4.1 MMOL/L (ref 3.6–5.1)
POTASSIUM SERPL-SCNC: 4.2 MMOL/L (ref 3.6–5.1)
POTASSIUM SERPL-SCNC: 4.4 MMOL/L (ref 3.6–5.1)
POTASSIUM SERPL-SCNC: 4.5 MMOL/L (ref 3.6–5.1)
PROCALCITONIN SERPL-MCNC: 0.85 NG/ML (ref ?–0.11)
Q-T INTERVAL: 358 MS
Q-T INTERVAL: 384 MS
QRS DURATION: 102 MS
QRS DURATION: 104 MS
QTC CALCULATION (BEZET): 437 MS
QTC CALCULATION (BEZET): 447 MS
R AXIS: -16 DEGREES
R AXIS: -8 DEGREES
RBC # BLD AUTO: 2.82 X10(6)UL (ref 3.8–5.8)
RBC # BLD AUTO: 2.98 X10(6)UL (ref 3.8–5.8)
RBC # BLD AUTO: 3.11 X10(6)UL (ref 3.8–5.8)
RBC # BLD AUTO: 3.12 X10(6)UL (ref 3.8–5.8)
RBC # BLD AUTO: 3.49 X10(6)UL (ref 3.8–5.8)
RBC # BLD AUTO: 3.68 X10(6)UL (ref 3.8–5.8)
RBC # BLD AUTO: 3.87 X10(6)UL (ref 3.8–5.8)
RED CELL DISTRIBUTION WIDTH-SD: 48.6 FL (ref 35.1–46.3)
RED CELL DISTRIBUTION WIDTH-SD: 49.4 FL (ref 35.1–46.3)
RED CELL DISTRIBUTION WIDTH-SD: 50.2 FL (ref 35.1–46.3)
RED CELL DISTRIBUTION WIDTH-SD: 51.6 FL (ref 35.1–46.3)
RED CELL DISTRIBUTION WIDTH-SD: 59.6 FL (ref 35.1–46.3)
RED CELL DISTRIBUTION WIDTH-SD: 68.2 FL (ref 35.1–46.3)
RED CELL DISTRIBUTION WIDTH-SD: 74.7 FL (ref 35.1–46.3)
RH BLOOD TYPE: POSITIVE
SODIUM SERPL-SCNC: 129 MMOL/L (ref 136–144)
SODIUM SERPL-SCNC: 133 MMOL/L (ref 136–144)
SODIUM SERPL-SCNC: 133 MMOL/L (ref 136–144)
SODIUM SERPL-SCNC: 134 MMOL/L (ref 136–144)
SODIUM SERPL-SCNC: 135 MMOL/L (ref 136–144)
SODIUM SERPL-SCNC: 135 MMOL/L (ref 136–144)
T AXIS: 20 DEGREES
T AXIS: 27 DEGREES
TOTAL CELLS COUNTED: 100
TROPONIN: 0.05 NG/ML (ref ?–0.05)
TROPONIN: <0.046 NG/ML (ref ?–0.05)
TROPONIN: <0.046 NG/ML (ref ?–0.05)
VENTRICULAR RATE: 78 BPM
VENTRICULAR RATE: 94 BPM
WBC # BLD AUTO: 10.7 X10(3) UL (ref 4–13)
WBC # BLD AUTO: 3.8 X10(3) UL (ref 4–13)
WBC # BLD AUTO: 3.8 X10(3) UL (ref 4–13)
WBC # BLD AUTO: 5.3 X10(3) UL (ref 4–13)
WBC # BLD AUTO: 6.3 X10(3) UL (ref 4–13)
WBC # BLD AUTO: 7.6 X10(3) UL (ref 4–13)
WBC # BLD AUTO: 7.9 X10(3) UL (ref 4–13)

## 2018-01-01 PROCEDURE — 71260 CT THORAX DX C+: CPT | Performed by: INTERNAL MEDICINE

## 2018-01-01 PROCEDURE — 77412 RADIATION TX DELIVERY LVL 3: CPT | Performed by: RADIOLOGY

## 2018-01-01 PROCEDURE — 99215 OFFICE O/P EST HI 40 MIN: CPT | Performed by: CLINICAL NURSE SPECIALIST

## 2018-01-01 PROCEDURE — 96417 CHEMO IV INFUS EACH ADDL SEQ: CPT

## 2018-01-01 PROCEDURE — 96413 CHEMO IV INFUSION 1 HR: CPT

## 2018-01-01 PROCEDURE — 99213 OFFICE O/P EST LOW 20 MIN: CPT | Performed by: INTERNAL MEDICINE

## 2018-01-01 PROCEDURE — 85007 BL SMEAR W/DIFF WBC COUNT: CPT

## 2018-01-01 PROCEDURE — 82962 GLUCOSE BLOOD TEST: CPT

## 2018-01-01 PROCEDURE — 85025 COMPLETE CBC W/AUTO DIFF WBC: CPT

## 2018-01-01 PROCEDURE — 77387 GUIDANCE FOR RADJ TX DLVR: CPT | Performed by: RADIOLOGY

## 2018-01-01 PROCEDURE — 36591 DRAW BLOOD OFF VENOUS DEVICE: CPT

## 2018-01-01 PROCEDURE — 80048 BASIC METABOLIC PNL TOTAL CA: CPT

## 2018-01-01 PROCEDURE — G9678 ONCOLOGY CARE MODEL SERVICE: HCPCS | Performed by: INTERNAL MEDICINE

## 2018-01-01 PROCEDURE — 99223 1ST HOSP IP/OBS HIGH 75: CPT | Performed by: INTERNAL MEDICINE

## 2018-01-01 PROCEDURE — 77334 RADIATION TREATMENT AID(S): CPT | Performed by: RADIOLOGY

## 2018-01-01 PROCEDURE — 96375 TX/PRO/DX INJ NEW DRUG ADDON: CPT

## 2018-01-01 PROCEDURE — 99214 OFFICE O/P EST MOD 30 MIN: CPT | Performed by: INTERNAL MEDICINE

## 2018-01-01 PROCEDURE — 71275 CT ANGIOGRAPHY CHEST: CPT | Performed by: EMERGENCY MEDICINE

## 2018-01-01 PROCEDURE — 99497 ADVNCD CARE PLAN 30 MIN: CPT | Performed by: NURSE PRACTITIONER

## 2018-01-01 PROCEDURE — 1111F DSCHRG MED/CURRENT MED MERGE: CPT | Performed by: FAMILY MEDICINE

## 2018-01-01 PROCEDURE — 77336 RADIATION PHYSICS CONSULT: CPT | Performed by: RADIOLOGY

## 2018-01-01 PROCEDURE — 80053 COMPREHEN METABOLIC PANEL: CPT

## 2018-01-01 PROCEDURE — 77295 3-D RADIOTHERAPY PLAN: CPT | Performed by: RADIOLOGY

## 2018-01-01 PROCEDURE — 99232 SBSQ HOSP IP/OBS MODERATE 35: CPT | Performed by: INTERNAL MEDICINE

## 2018-01-01 PROCEDURE — 77300 RADIATION THERAPY DOSE PLAN: CPT | Performed by: RADIOLOGY

## 2018-01-01 PROCEDURE — 82565 ASSAY OF CREATININE: CPT

## 2018-01-01 PROCEDURE — 99202 OFFICE O/P NEW SF 15 MIN: CPT | Performed by: NURSE PRACTITIONER

## 2018-01-01 PROCEDURE — 85027 COMPLETE CBC AUTOMATED: CPT

## 2018-01-01 PROCEDURE — 93306 TTE W/DOPPLER COMPLETE: CPT | Performed by: HOSPITALIST

## 2018-01-01 PROCEDURE — 76937 US GUIDE VASCULAR ACCESS: CPT

## 2018-01-01 PROCEDURE — 05HM33Z INSERTION OF INFUSION DEVICE INTO RIGHT INTERNAL JUGULAR VEIN, PERCUTANEOUS APPROACH: ICD-10-PCS | Performed by: RADIOLOGY

## 2018-01-01 PROCEDURE — 96374 THER/PROPH/DIAG INJ IV PUSH: CPT

## 2018-01-01 PROCEDURE — 71045 X-RAY EXAM CHEST 1 VIEW: CPT | Performed by: NURSE PRACTITIONER

## 2018-01-01 PROCEDURE — A9575 INJ GADOTERATE MEGLUMI 0.1ML: HCPCS | Performed by: INTERNAL MEDICINE

## 2018-01-01 PROCEDURE — 99239 HOSP IP/OBS DSCHRG MGMT >30: CPT | Performed by: HOSPITALIST

## 2018-01-01 PROCEDURE — 77470 SPECIAL RADIATION TREATMENT: CPT | Performed by: RADIOLOGY

## 2018-01-01 PROCEDURE — 96361 HYDRATE IV INFUSION ADD-ON: CPT

## 2018-01-01 PROCEDURE — 99214 OFFICE O/P EST MOD 30 MIN: CPT

## 2018-01-01 PROCEDURE — 77331 SPECIAL RADIATION DOSIMETRY: CPT | Performed by: RADIOLOGY

## 2018-01-01 PROCEDURE — 36561 INSERT TUNNELED CV CATH: CPT

## 2018-01-01 PROCEDURE — 99215 OFFICE O/P EST HI 40 MIN: CPT | Performed by: INTERNAL MEDICINE

## 2018-01-01 PROCEDURE — 99495 TRANSJ CARE MGMT MOD F2F 14D: CPT | Performed by: FAMILY MEDICINE

## 2018-01-01 PROCEDURE — 96409 CHEMO IV PUSH SNGL DRUG: CPT

## 2018-01-01 PROCEDURE — 99215 OFFICE O/P EST HI 40 MIN: CPT | Performed by: NURSE PRACTITIONER

## 2018-01-01 PROCEDURE — 74177 CT ABD & PELVIS W/CONTRAST: CPT | Performed by: INTERNAL MEDICINE

## 2018-01-01 PROCEDURE — 77293 RESPIRATOR MOTION MGMT SIMUL: CPT | Performed by: RADIOLOGY

## 2018-01-01 PROCEDURE — 99214 OFFICE O/P EST MOD 30 MIN: CPT | Performed by: CLINICAL NURSE SPECIALIST

## 2018-01-01 PROCEDURE — 99232 SBSQ HOSP IP/OBS MODERATE 35: CPT | Performed by: HOSPITALIST

## 2018-01-01 PROCEDURE — G0439 PPPS, SUBSEQ VISIT: HCPCS | Performed by: FAMILY MEDICINE

## 2018-01-01 PROCEDURE — 77290 THER RAD SIMULAJ FIELD CPLX: CPT | Performed by: RADIOLOGY

## 2018-01-01 PROCEDURE — 99239 HOSP IP/OBS DSCHRG MGMT >30: CPT | Performed by: INTERNAL MEDICINE

## 2018-01-01 PROCEDURE — 99214 OFFICE O/P EST MOD 30 MIN: CPT | Performed by: FAMILY MEDICINE

## 2018-01-01 PROCEDURE — 96377 APPLICATON ON-BODY INJECTOR: CPT

## 2018-01-01 PROCEDURE — 70553 MRI BRAIN STEM W/O & W/DYE: CPT | Performed by: INTERNAL MEDICINE

## 2018-01-01 PROCEDURE — 84443 ASSAY THYROID STIM HORMONE: CPT

## 2018-01-01 PROCEDURE — 99211 OFF/OP EST MAY X REQ PHY/QHP: CPT

## 2018-01-01 PROCEDURE — 30233R1 TRANSFUSION OF NONAUTOLOGOUS PLATELETS INTO PERIPHERAL VEIN, PERCUTANEOUS APPROACH: ICD-10-PCS | Performed by: HOSPITALIST

## 2018-01-01 PROCEDURE — 77399 UNLISTED PX MED RADJ PHYSICS: CPT | Performed by: RADIOLOGY

## 2018-01-01 PROCEDURE — 99223 1ST HOSP IP/OBS HIGH 75: CPT | Performed by: HOSPITALIST

## 2018-01-01 PROCEDURE — 0JH63XZ INSERTION OF TUNNELED VASCULAR ACCESS DEVICE INTO CHEST SUBCUTANEOUS TISSUE AND FASCIA, PERCUTANEOUS APPROACH: ICD-10-PCS | Performed by: RADIOLOGY

## 2018-01-01 PROCEDURE — A9576 INJ PROHANCE MULTIPACK: HCPCS | Performed by: INTERNAL MEDICINE

## 2018-01-01 PROCEDURE — 77280 THER RAD SIMULAJ FIELD SMPL: CPT | Performed by: RADIOLOGY

## 2018-01-01 PROCEDURE — 71045 X-RAY EXAM CHEST 1 VIEW: CPT | Performed by: EMERGENCY MEDICINE

## 2018-01-01 PROCEDURE — 77001 FLUOROGUIDE FOR VEIN DEVICE: CPT

## 2018-01-01 PROCEDURE — 96360 HYDRATION IV INFUSION INIT: CPT

## 2018-01-01 PROCEDURE — 0DJ08ZZ INSPECTION OF UPPER INTESTINAL TRACT, VIA NATURAL OR ARTIFICIAL OPENING ENDOSCOPIC: ICD-10-PCS | Performed by: INTERNAL MEDICINE

## 2018-01-01 PROCEDURE — 99152 MOD SED SAME PHYS/QHP 5/>YRS: CPT

## 2018-01-01 PROCEDURE — 78815 PET IMAGE W/CT SKULL-THIGH: CPT | Performed by: INTERNAL MEDICINE

## 2018-01-01 PROCEDURE — 30233N1 TRANSFUSION OF NONAUTOLOGOUS RED BLOOD CELLS INTO PERIPHERAL VEIN, PERCUTANEOUS APPROACH: ICD-10-PCS | Performed by: HOSPITALIST

## 2018-01-01 PROCEDURE — 71045 X-RAY EXAM CHEST 1 VIEW: CPT | Performed by: INTERNAL MEDICINE

## 2018-01-01 RX ORDER — SODIUM CHLORIDE 0.9 % (FLUSH) 0.9 %
10 SYRINGE (ML) INJECTION ONCE
Status: CANCELLED | OUTPATIENT
Start: 2018-01-01

## 2018-01-01 RX ORDER — AZELASTINE 1 MG/ML
1 SPRAY, METERED NASAL 2 TIMES DAILY
Status: DISCONTINUED | OUTPATIENT
Start: 2018-01-01 | End: 2018-01-01

## 2018-01-01 RX ORDER — ONDANSETRON 2 MG/ML
8 INJECTION INTRAMUSCULAR; INTRAVENOUS EVERY 6 HOURS PRN
Status: CANCELLED | OUTPATIENT
Start: 2018-01-01

## 2018-01-01 RX ORDER — LORAZEPAM 0.5 MG/1
TABLET ORAL EVERY 4 HOURS PRN
Status: CANCELLED | OUTPATIENT
Start: 2018-01-01

## 2018-01-01 RX ORDER — METOCLOPRAMIDE HYDROCHLORIDE 5 MG/ML
10 INJECTION INTRAMUSCULAR; INTRAVENOUS EVERY 6 HOURS PRN
Status: CANCELLED | OUTPATIENT
Start: 2018-01-01

## 2018-01-01 RX ORDER — HYDROCODONE BITARTRATE AND ACETAMINOPHEN 5; 325 MG/1; MG/1
1 TABLET ORAL EVERY 6 HOURS PRN
Qty: 40 TABLET | Refills: 0 | Status: SHIPPED | OUTPATIENT
Start: 2018-01-01 | End: 2018-01-01

## 2018-01-01 RX ORDER — HYDROMORPHONE HYDROCHLORIDE 1 MG/ML
0.4 INJECTION, SOLUTION INTRAMUSCULAR; INTRAVENOUS; SUBCUTANEOUS EVERY 2 HOUR PRN
Status: DISCONTINUED | OUTPATIENT
Start: 2018-01-01 | End: 2018-01-01

## 2018-01-01 RX ORDER — HYDROCODONE BITARTRATE AND ACETAMINOPHEN 5; 325 MG/1; MG/1
1 TABLET ORAL 2 TIMES DAILY PRN
Qty: 60 TABLET | Refills: 0 | Status: SHIPPED | OUTPATIENT
Start: 2018-01-01 | End: 2018-01-01

## 2018-01-01 RX ORDER — GUAIFENESIN 400 MG/1
1 TABLET ORAL 2 TIMES DAILY
COMMUNITY
End: 2018-01-01

## 2018-01-01 RX ORDER — LORAZEPAM 2 MG/ML
INJECTION INTRAMUSCULAR EVERY 4 HOURS PRN
Status: CANCELLED | OUTPATIENT
Start: 2018-01-01

## 2018-01-01 RX ORDER — ONDANSETRON HYDROCHLORIDE 8 MG/1
8 TABLET, FILM COATED ORAL EVERY 8 HOURS PRN
Qty: 30 TABLET | Refills: 3 | Status: SHIPPED
Start: 2018-01-01 | End: 2018-01-01 | Stop reason: ALTCHOICE

## 2018-01-01 RX ORDER — PROCHLORPERAZINE MALEATE 10 MG
10 TABLET ORAL EVERY 6 HOURS PRN
Status: CANCELLED | OUTPATIENT
Start: 2018-01-01

## 2018-01-01 RX ORDER — FUROSEMIDE 20 MG/1
20 TABLET ORAL ONCE
Status: COMPLETED | OUTPATIENT
Start: 2018-01-01 | End: 2018-01-01

## 2018-01-01 RX ORDER — HYDROCODONE BITARTRATE AND ACETAMINOPHEN 5; 325 MG/1; MG/1
1 TABLET ORAL EVERY 6 HOURS PRN
Qty: 50 TABLET | Refills: 0 | Status: CANCELLED | OUTPATIENT
Start: 2018-01-01

## 2018-01-01 RX ORDER — SODIUM CHLORIDE 0.9 % (FLUSH) 0.9 %
10 SYRINGE (ML) INJECTION ONCE
Status: COMPLETED | OUTPATIENT
Start: 2018-01-01 | End: 2018-01-01

## 2018-01-01 RX ORDER — PNV NO.95/FERROUS FUM/FOLIC AC 28MG-0.8MG
TABLET ORAL
COMMUNITY

## 2018-01-01 RX ORDER — LIDOCAINE 50 MG/G
1 PATCH TOPICAL EVERY 24 HOURS
Status: DISCONTINUED | OUTPATIENT
Start: 2018-01-01 | End: 2018-01-01

## 2018-01-01 RX ORDER — SODIUM CHLORIDE 9 MG/ML
INJECTION, SOLUTION INTRAVENOUS ONCE
Status: COMPLETED | OUTPATIENT
Start: 2018-01-01 | End: 2018-01-01

## 2018-01-01 RX ORDER — MEMANTINE HYDROCHLORIDE 10 MG/1
TABLET ORAL
Refills: 2 | COMMUNITY
Start: 2018-01-01

## 2018-01-01 RX ORDER — LORATADINE 10 MG/1
10 CAPSULE, LIQUID FILLED ORAL DAILY
COMMUNITY
End: 2018-01-01 | Stop reason: ALTCHOICE

## 2018-01-01 RX ORDER — ALPRAZOLAM 0.25 MG/1
0.25 TABLET ORAL EVERY 6 HOURS PRN
Qty: 12 TABLET | Refills: 0 | Status: SHIPPED | OUTPATIENT
Start: 2018-01-01

## 2018-01-01 RX ORDER — FLUTICASONE PROPIONATE 50 MCG
1 SPRAY, SUSPENSION (ML) NASAL DAILY
Status: DISCONTINUED | OUTPATIENT
Start: 2018-01-01 | End: 2018-01-01

## 2018-01-01 RX ORDER — BLOOD-GLUCOSE METER
KIT MISCELLANEOUS
Qty: 100 STRIP | Refills: 5 | Status: SHIPPED | OUTPATIENT
Start: 2018-01-01 | End: 2018-01-01

## 2018-01-01 RX ORDER — CETIRIZINE HYDROCHLORIDE 5 MG/1
5 TABLET ORAL DAILY
Status: DISCONTINUED | OUTPATIENT
Start: 2018-01-01 | End: 2018-01-01

## 2018-01-01 RX ORDER — HYDROCHLOROTHIAZIDE 12.5 MG/1
12.5 CAPSULE, GELATIN COATED ORAL DAILY
Status: DISCONTINUED | OUTPATIENT
Start: 2018-01-01 | End: 2018-01-01

## 2018-01-01 RX ORDER — AZELASTINE 1 MG/ML
1 SPRAY, METERED NASAL 2 TIMES DAILY
COMMUNITY
End: 2018-01-01 | Stop reason: ALTCHOICE

## 2018-01-01 RX ORDER — POTASSIUM CHLORIDE 14.9 MG/ML
20 INJECTION INTRAVENOUS ONCE
Status: COMPLETED | OUTPATIENT
Start: 2018-01-01 | End: 2018-01-01

## 2018-01-01 RX ORDER — SENNOSIDES 8.6 MG
1300 CAPSULE ORAL 2 TIMES DAILY
COMMUNITY

## 2018-01-01 RX ORDER — MAGNESIUM OXIDE 400 MG (241.3 MG MAGNESIUM) TABLET
100 TABLET DAILY
COMMUNITY

## 2018-01-01 RX ORDER — HEPARIN SODIUM 5000 [USP'U]/ML
INJECTION, SOLUTION INTRAVENOUS; SUBCUTANEOUS
Status: COMPLETED
Start: 2018-01-01 | End: 2018-01-01

## 2018-01-01 RX ORDER — PROCHLORPERAZINE MALEATE 10 MG
10 TABLET ORAL EVERY 6 HOURS PRN
Qty: 30 TABLET | Refills: 3 | Status: SHIPPED | OUTPATIENT
Start: 2018-01-01 | End: 2018-01-01 | Stop reason: ALTCHOICE

## 2018-01-01 RX ORDER — LOSARTAN POTASSIUM 100 MG/1
100 TABLET ORAL DAILY
Status: DISCONTINUED | OUTPATIENT
Start: 2018-01-01 | End: 2018-01-01

## 2018-01-01 RX ORDER — ACETAMINOPHEN 325 MG/1
1250 TABLET ORAL EVERY 12 HOURS PRN
COMMUNITY
End: 2018-01-01

## 2018-01-01 RX ORDER — SODIUM CHLORIDE 9 MG/ML
500 INJECTION, SOLUTION INTRAVENOUS ONCE
Status: COMPLETED | OUTPATIENT
Start: 2018-01-01 | End: 2018-01-01

## 2018-01-01 RX ORDER — MEMANTINE HYDROCHLORIDE 10 MG/1
10 TABLET ORAL 2 TIMES DAILY
Qty: 60 TABLET | Refills: 5 | Status: SHIPPED | OUTPATIENT
Start: 2018-01-01 | End: 2018-01-01

## 2018-01-01 RX ORDER — AMLODIPINE BESYLATE 5 MG/1
5 TABLET ORAL DAILY
Status: DISCONTINUED | OUTPATIENT
Start: 2018-01-01 | End: 2018-01-01

## 2018-01-01 RX ORDER — PANTOPRAZOLE SODIUM 20 MG/1
20 TABLET, DELAYED RELEASE ORAL
Status: DISCONTINUED | OUTPATIENT
Start: 2018-01-01 | End: 2018-01-01

## 2018-01-01 RX ORDER — ACETAMINOPHEN 325 MG/1
650 TABLET ORAL EVERY 6 HOURS PRN
Status: DISCONTINUED | OUTPATIENT
Start: 2018-01-01 | End: 2018-01-01

## 2018-01-01 RX ORDER — ASPIRIN 81 MG/1
81 TABLET ORAL DAILY
Qty: 90 TABLET | Refills: 3 | Status: ON HOLD | OUTPATIENT
Start: 2018-01-01 | End: 2018-01-01

## 2018-01-01 RX ORDER — GUAIFENESIN 600 MG
600 TABLET, EXTENDED RELEASE 12 HR ORAL 2 TIMES DAILY
Status: ON HOLD | COMMUNITY
End: 2018-01-01

## 2018-01-01 RX ORDER — ASPIRIN 81 MG/1
324 TABLET, CHEWABLE ORAL ONCE
Status: COMPLETED | OUTPATIENT
Start: 2018-01-01 | End: 2018-01-01

## 2018-01-01 RX ORDER — HYDROCODONE BITARTRATE AND ACETAMINOPHEN 5; 325 MG/1; MG/1
1 TABLET ORAL 2 TIMES DAILY PRN
Qty: 60 TABLET | Refills: 0 | Status: SHIPPED | OUTPATIENT
Start: 2018-01-01

## 2018-01-01 RX ORDER — AZELASTINE 1 MG/ML
SPRAY, METERED NASAL
Qty: 2 BOTTLE | Refills: 5 | Status: SHIPPED | OUTPATIENT
Start: 2018-01-01 | End: 2018-01-01

## 2018-01-01 RX ORDER — ECHINACEA PURPUREA EXTRACT 125 MG
1 TABLET ORAL DAILY
Status: DISCONTINUED | OUTPATIENT
Start: 2018-01-01 | End: 2018-01-01

## 2018-01-01 RX ORDER — HYDROCODONE BITARTRATE AND ACETAMINOPHEN 5; 325 MG/1; MG/1
1 TABLET ORAL EVERY 6 HOURS PRN
Qty: 20 TABLET | Refills: 0 | Status: SHIPPED | OUTPATIENT
Start: 2018-01-01 | End: 2018-01-01

## 2018-01-01 RX ORDER — LIDOCAINE HYDROCHLORIDE 10 MG/ML
INJECTION, SOLUTION EPIDURAL; INFILTRATION; INTRACAUDAL; PERINEURAL
Status: COMPLETED
Start: 2018-01-01 | End: 2018-01-01

## 2018-01-01 RX ORDER — HYDROCODONE BITARTRATE AND ACETAMINOPHEN 5; 325 MG/1; MG/1
1 TABLET ORAL EVERY 6 HOURS PRN
Qty: 50 TABLET | Refills: 0 | Status: SHIPPED | OUTPATIENT
Start: 2018-01-01 | End: 2018-01-01

## 2018-01-01 RX ORDER — ALLOPURINOL 300 MG/1
TABLET ORAL
Qty: 90 TABLET | Refills: 1 | Status: SHIPPED | OUTPATIENT
Start: 2018-01-01 | End: 2018-01-01

## 2018-01-01 RX ORDER — ONDANSETRON HYDROCHLORIDE 8 MG/1
8 TABLET, FILM COATED ORAL EVERY 8 HOURS PRN
Qty: 30 TABLET | Refills: 3 | Status: ON HOLD | OUTPATIENT
Start: 2018-01-01 | End: 2018-01-01

## 2018-01-01 RX ORDER — OXYBUTYNIN CHLORIDE 15 MG/1
15 TABLET, EXTENDED RELEASE ORAL DAILY
COMMUNITY

## 2018-01-01 RX ORDER — PROCHLORPERAZINE MALEATE 10 MG
10 TABLET ORAL EVERY 6 HOURS PRN
Status: DISCONTINUED | OUTPATIENT
Start: 2018-01-01 | End: 2018-01-01

## 2018-01-01 RX ORDER — SIMVASTATIN 10 MG
TABLET ORAL
Qty: 90 TABLET | Refills: 3 | Status: SHIPPED | OUTPATIENT
Start: 2018-01-01

## 2018-01-01 RX ORDER — SODIUM CHLORIDE 9 MG/ML
INJECTION, SOLUTION INTRAVENOUS CONTINUOUS
Status: DISCONTINUED | OUTPATIENT
Start: 2018-01-01 | End: 2018-01-01

## 2018-01-01 RX ORDER — DEXTROSE MONOHYDRATE 25 G/50ML
50 INJECTION, SOLUTION INTRAVENOUS
Status: DISCONTINUED | OUTPATIENT
Start: 2018-01-01 | End: 2018-01-01

## 2018-01-01 RX ORDER — CEFAZOLIN SODIUM 1 G/3ML
INJECTION, POWDER, FOR SOLUTION INTRAMUSCULAR; INTRAVENOUS
Status: COMPLETED
Start: 2018-01-01 | End: 2018-01-01

## 2018-01-01 RX ORDER — MEMANTINE HYDROCHLORIDE 5 MG-10 MG
KIT ORAL
Qty: 1 PACKAGE | Refills: 0 | Status: SHIPPED | OUTPATIENT
Start: 2018-01-01 | End: 2018-01-01

## 2018-01-01 RX ORDER — ALLOPURINOL 300 MG/1
TABLET ORAL
Qty: 90 TABLET | Refills: 1 | Status: SHIPPED | OUTPATIENT
Start: 2018-01-01

## 2018-01-01 RX ORDER — FUROSEMIDE 20 MG/1
20 TABLET ORAL DAILY PRN
Qty: 30 TABLET | Refills: 1 | Status: SHIPPED | OUTPATIENT
Start: 2018-01-01

## 2018-01-01 RX ORDER — DIPHENHYDRAMINE HCL 25 MG
25 TABLET ORAL 2 TIMES DAILY
COMMUNITY
End: 2018-01-01 | Stop reason: ALTCHOICE

## 2018-01-01 RX ORDER — HYDROMORPHONE HYDROCHLORIDE 1 MG/ML
0.8 INJECTION, SOLUTION INTRAMUSCULAR; INTRAVENOUS; SUBCUTANEOUS EVERY 2 HOUR PRN
Status: DISCONTINUED | OUTPATIENT
Start: 2018-01-01 | End: 2018-01-01

## 2018-01-01 RX ORDER — MIDAZOLAM HYDROCHLORIDE 1 MG/ML
INJECTION INTRAMUSCULAR; INTRAVENOUS
Status: COMPLETED
Start: 2018-01-01 | End: 2018-01-01

## 2018-01-01 RX ORDER — HYDROMORPHONE HYDROCHLORIDE 1 MG/ML
0.2 INJECTION, SOLUTION INTRAMUSCULAR; INTRAVENOUS; SUBCUTANEOUS EVERY 2 HOUR PRN
Status: DISCONTINUED | OUTPATIENT
Start: 2018-01-01 | End: 2018-01-01

## 2018-01-01 RX ORDER — POTASSIUM CHLORIDE 20 MEQ/1
40 TABLET, EXTENDED RELEASE ORAL ONCE
Status: COMPLETED | OUTPATIENT
Start: 2018-01-01 | End: 2018-01-01

## 2018-01-01 RX ORDER — AMLODIPINE BESYLATE 5 MG/1
5 TABLET ORAL DAILY
COMMUNITY
End: 2018-01-01

## 2018-01-01 RX ORDER — PRAVASTATIN SODIUM 20 MG
20 TABLET ORAL NIGHTLY
Status: DISCONTINUED | OUTPATIENT
Start: 2018-01-01 | End: 2018-01-01

## 2018-01-01 RX ORDER — METOCLOPRAMIDE HYDROCHLORIDE 5 MG/ML
5 INJECTION INTRAMUSCULAR; INTRAVENOUS EVERY 8 HOURS PRN
Status: DISCONTINUED | OUTPATIENT
Start: 2018-01-01 | End: 2018-01-01

## 2018-01-01 RX ORDER — ASPIRIN 81 MG/1
81 TABLET ORAL DAILY
Status: DISCONTINUED | OUTPATIENT
Start: 2018-01-01 | End: 2018-01-01

## 2018-01-01 RX ORDER — OMEPRAZOLE 20 MG/1
CAPSULE, DELAYED RELEASE ORAL
Qty: 180 CAPSULE | Refills: 1 | Status: SHIPPED | OUTPATIENT
Start: 2018-01-01

## 2018-01-01 RX ORDER — DEXAMETHASONE 1 MG
1 TABLET ORAL 2 TIMES DAILY WITH MEALS
Qty: 60 TABLET | Refills: 1 | Status: SHIPPED | OUTPATIENT
Start: 2018-01-01

## 2018-01-01 RX ORDER — OMEPRAZOLE 20 MG/1
20 CAPSULE, DELAYED RELEASE ORAL
COMMUNITY
End: 2018-01-01

## 2018-01-01 RX ORDER — OMEPRAZOLE 20 MG/1
CAPSULE, DELAYED RELEASE ORAL
Qty: 180 CAPSULE | Refills: 1 | Status: SHIPPED | OUTPATIENT
Start: 2018-01-01 | End: 2018-01-01

## 2018-01-01 RX ORDER — MEMANTINE HYDROCHLORIDE 5 MG-10 MG
KIT ORAL
Qty: 1 PACKAGE | Refills: 5 | Status: SHIPPED | OUTPATIENT
Start: 2018-01-01 | End: 2018-01-01

## 2018-01-01 RX ORDER — LIDOCAINE HYDROCHLORIDE AND EPINEPHRINE 15; 5 MG/ML; UG/ML
INJECTION, SOLUTION EPIDURAL
Status: COMPLETED
Start: 2018-01-01 | End: 2018-01-01

## 2018-01-01 RX ORDER — KETOROLAC TROMETHAMINE 30 MG/ML
15 INJECTION, SOLUTION INTRAMUSCULAR; INTRAVENOUS ONCE
Status: COMPLETED | OUTPATIENT
Start: 2018-01-01 | End: 2018-01-01

## 2018-01-01 RX ORDER — MAGNESIUM OXIDE 400 MG (241.3 MG MAGNESIUM) TABLET
400 TABLET ONCE
Status: COMPLETED | OUTPATIENT
Start: 2018-01-01 | End: 2018-01-01

## 2018-01-01 RX ORDER — ALLOPURINOL 300 MG/1
300 TABLET ORAL DAILY
Status: DISCONTINUED | OUTPATIENT
Start: 2018-01-01 | End: 2018-01-01

## 2018-01-01 RX ORDER — GUAIFENESIN 600 MG
600 TABLET, EXTENDED RELEASE 12 HR ORAL 2 TIMES DAILY
Status: DISCONTINUED | OUTPATIENT
Start: 2018-01-01 | End: 2018-01-01

## 2018-01-01 RX ORDER — FLUTICASONE PROPIONATE 50 MCG
1 SPRAY, SUSPENSION (ML) NASAL DAILY
COMMUNITY
End: 2018-01-01 | Stop reason: ALTCHOICE

## 2018-01-01 RX ORDER — PREDNISONE 10 MG/1
TABLET ORAL
Qty: 10 TABLET | Refills: 1 | Status: SHIPPED | OUTPATIENT
Start: 2018-01-01 | End: 2018-01-01 | Stop reason: ALTCHOICE

## 2018-01-01 RX ORDER — MULTIVIT-MIN/FOLIC ACID/LUTEIN 400-250MCG
1 TABLET,CHEWABLE ORAL DAILY
COMMUNITY

## 2018-01-01 RX ORDER — SIMVASTATIN 10 MG
10 TABLET ORAL NIGHTLY
COMMUNITY
End: 2018-01-01

## 2018-01-01 RX ORDER — LOSARTAN POTASSIUM AND HYDROCHLOROTHIAZIDE 12.5; 1 MG/1; MG/1
TABLET ORAL
Qty: 90 TABLET | Refills: 3 | Status: SHIPPED | OUTPATIENT
Start: 2018-01-01 | End: 2018-01-01

## 2018-01-01 RX ORDER — METOCLOPRAMIDE HYDROCHLORIDE 5 MG/ML
10 INJECTION INTRAMUSCULAR; INTRAVENOUS EVERY 8 HOURS PRN
Status: DISCONTINUED | OUTPATIENT
Start: 2018-01-01 | End: 2018-01-01

## 2018-01-01 RX ADMIN — SODIUM CHLORIDE 0.9 % (FLUSH) 10 ML: 0.9 % SYRINGE (ML) INJECTION at 11:01:00

## 2018-01-01 RX ADMIN — SODIUM CHLORIDE 0.9 % (FLUSH) 10 ML: 0.9 % SYRINGE (ML) INJECTION at 10:34:00

## 2018-01-01 RX ADMIN — SODIUM CHLORIDE 500 ML: 9 INJECTION, SOLUTION INTRAVENOUS at 11:30:00

## 2018-01-01 RX ADMIN — SODIUM CHLORIDE: 9 INJECTION, SOLUTION INTRAVENOUS at 14:00:00

## 2018-01-01 RX ADMIN — SODIUM CHLORIDE 0.9 % (FLUSH) 10 ML: 0.9 % SYRINGE (ML) INJECTION at 13:04:00

## 2018-01-01 RX ADMIN — SODIUM CHLORIDE 0.9 % (FLUSH) 10 ML: 0.9 % SYRINGE (ML) INJECTION at 12:48:00

## 2018-01-01 RX ADMIN — SODIUM CHLORIDE 0.9 % (FLUSH) 10 ML: 0.9 % SYRINGE (ML) INJECTION at 15:21:00

## 2018-01-01 RX ADMIN — SODIUM CHLORIDE 0.9 % (FLUSH) 10 ML: 0.9 % SYRINGE (ML) INJECTION at 09:50:00

## 2018-01-01 RX ADMIN — SODIUM CHLORIDE 0.9 % (FLUSH) 10 ML: 0.9 % SYRINGE (ML) INJECTION at 10:37:00

## 2018-01-01 RX ADMIN — SODIUM CHLORIDE 0.9 % (FLUSH) 10 ML: 0.9 % SYRINGE (ML) INJECTION at 08:53:00

## 2018-01-01 RX ADMIN — SODIUM CHLORIDE 0.9 % (FLUSH) 10 ML: 0.9 % SYRINGE (ML) INJECTION at 14:12:00

## 2018-01-01 RX ADMIN — FUROSEMIDE 20 MG: 20 TABLET ORAL at 12:12:00

## 2018-01-01 RX ADMIN — SODIUM CHLORIDE 0.9 % (FLUSH) 10 ML: 0.9 % SYRINGE (ML) INJECTION at 10:05:00

## 2018-01-01 RX ADMIN — SODIUM CHLORIDE 0.9 % (FLUSH) 10 ML: 0.9 % SYRINGE (ML) INJECTION at 11:38:00

## 2018-01-01 RX ADMIN — FUROSEMIDE 20 MG: 20 TABLET ORAL at 09:53:00

## 2018-01-01 RX ADMIN — SODIUM CHLORIDE 500 ML: 9 INJECTION, SOLUTION INTRAVENOUS at 12:05:00

## 2018-01-04 PROBLEM — C34.90 SMALL CELL LUNG CANCER, UNSPECIFIED LATERALITY (HCC): Status: ACTIVE | Noted: 2018-01-01

## 2018-01-04 NOTE — PROGRESS NOTES
Nutrition screen complete as triggered by [facility] dx of SCLC w/ brain met. Chart reviewed. Noted this is a 81 y/o to begin chemotherapy. HT/WT noted. Pt appears at a moderate nutrition risk at present. RD available on consult.

## 2018-01-04 NOTE — TELEPHONE ENCOUNTER
[facility] calling to verify pt will be receiving IV chemotherapy so Medicare Part B can be billed for ondansetron Rx. [address]- spoke with Nicho William- informed pt will be receiving IV chemotherapy.  Updated Ondansetron Rx faxed to [facility]

## 2018-01-04 NOTE — PROGRESS NOTES
Cancer Center Progress Note  Patient Name: Lina Monzon   YOB: 1935   Medical Record Number: [id number]   CSN: [id number]   Attending Physician: Romario Taylor M.D.        Date of Visit: 1/4/2018     Chief Complaint:  Patient present Shortness of breath    • Sleep apnea     does not treat   • Type II or unspecified type diabetes mellitus without mention of complication, not stated as uncontrolled    • Unspecified essential hypertension        Past Surgical History:  Past Surgical Histo pain, palpitations or orthopnea. Gastrointestinal No nausea, vomiting, diarrhea, GI bleeding, or constipation. Genitorurinary (M) No hematuria, dysuria, or incontinence. No abnormal bleeding.    Integumentary No rashes or yellowing of the skin   Neurolo CO2 27.0   ALKPHO 113   AST 22   ALT 29   BILT 0.5   TP 8.0     Radiology:  MRI Brain: CONCLUSION:       1. Solitary focus of metastatic disease involving the left posterior cerebellum.      2.  Mild to moderate periventricular small vessel ischemic hanson by:    Andres Palomino M.D.   [facility]

## 2018-01-04 NOTE — H&P
[facility]   History & Physical    [facility] HOSP & [facility] Patient Status:  Outpatient in a Bed    3/9/1935 MRN [id number]   [facility]   Hosp Day # 0 PCP DO Tootie Aparicio • Heart Disease Mother        Allergies/Medications: Allergies:  No Known Allergies    Medications:  No current outpatient prescriptions on file. Physical Exam & Review of Systems:   Physical Exam:    There were no vitals taken for this visit.     Ge

## 2018-01-04 NOTE — PROGRESS NOTES
Pt alert and oriented, ready to be d/keren home. Discharge instructions reviewed with pt and pt's wife. They verbalize understanding. Pt's PAC dressing is c/d/i. IV d/keren and pt escorted to lobby via wheelchair.

## 2018-01-04 NOTE — PROGRESS NOTES
Pt here with family for 1 week MD f/u visit. Pt denies any new complaints @ this time. Here to discuss brain MRI and POC. Scheduled for port placement this afternoon.      Education Record    Learner:  Patient and family    Disease / Pat Resendez

## 2018-01-08 NOTE — PROGRESS NOTES
IV Chemotherapy Education    Patient: José Miguel Rea   Date: 1/8/18   Diagnosis:Small cell lung cancer Caregivers present: spouse    Drug names: Carboplatin/Etoposide     Treatment Effects on Bone Marrow:  Chemotherapy action on cancer / normal cells}  Function Group

## 2018-01-10 NOTE — PROGRESS NOTES
Education Record    Learner:  Patient    Disease / Ann Marie Larch cancer    Barriers / Limitations:  None    Method:  Brief focused, printed material and  reinforcement    General Topics:  Plan of care reviewed    Outcome:  Shows understanding

## 2018-01-10 NOTE — PATIENT INSTRUCTIONS
To reach Dr Rochelle Vernon nurse during business hours, please call [phone]. After hours, including weekends, evenings, and holidays, please call the main number [phone] for emergent needs.

## 2018-01-11 PROBLEM — R79.89 ELEVATED SERUM CREATININE: Status: ACTIVE | Noted: 2018-01-01

## 2018-01-11 NOTE — PROGRESS NOTES
Education Record    Learner:  Patient    Disease / [place] Maxwell cancer    Barriers / Limitations:  None    Method:  Brief focused, printed material and  reinforcement    General Topics:  Plan of care reviewed    Outcome:  Shows understanding

## 2018-01-12 NOTE — PROGRESS NOTES
[facility] Progress Note    Patient Name: Ezekiel Petty   YOB: 1935   Medical Record Number: [id number]   CSN: [id number]   Date of visit: 1/12/2018   Provider: KRYS Castro  Referring Physician: Jose Grimes the cough persists. He reports gradual decrease in his ability to get around.  He feels somewhat unsteady on his feel and his legs feel weaker.  He also becomes winded more easily.       Positive tobacco history 25pk years, quit in 1992.    1/10/18 initiate MG Oral Tab, Take 1 tablet by mouth 2 (two) times daily. , Disp: , Rfl:   •  ibuprofen (MOTRIN IB) 200 MG Oral Tab, , Disp: , Rfl:   •  Nasal Moisturizer Combination (OCEAN ULTRA SALINE MIST NA), by Nasal route., Disp: , Rfl:   •  Homeopathic Products (PROS (from the past 24 hour(s))  -CREATININE, SERUM   Collection Time: 01/12/18 10:39 AM   Result Value Ref Range   Creatinine 1.21 0.70 - 1.30 mg/dL   GFR 55 (L) >=60         Impression/Plan:  1. Small cell lung cancer:  Proceed with C1, D3 Etoposide.   Overal

## 2018-01-12 NOTE — PROGRESS NOTES
Pt here for day 3 -16. Weight up 8lbs from yesterday, An Purcell, NP here to assess pt. Creatinine drawn today 1.21. JACQUELYN James, pt given lasix 20mg PO. Pt states he's been eating well at home and feeling overall very well.      Education Record    Learner:  Rosa Isela Erickson

## 2018-01-12 NOTE — PROGRESS NOTES
Date of Treatment: 1/10/18                                Type of Chemo: Carbo/Etop    Comments: see nursing note- pt here for D3 chemo, weight gain noted.   Recommendations: see note

## 2018-01-15 NOTE — PROGRESS NOTES
Called patient to check on patient. Patient is currently sleeping. Wife relates they have an appointment tomorrow.      Time spent with patient 6 mins

## 2018-01-18 NOTE — PROGRESS NOTES
ANP Visit Note    Patient Name: Shelia Sosa   YOB: 1935   Medical Record Number: [id number]   CSN: [id number]   Date of visit: 1/18/2018       Chief Complaint/Reason for Visit:  Patient presents with: Follow - Up: Pt here for day 8 f/u. Esophageal reflux    • Glaucoma     Has high pressure for last 25 years    • Gout    • Hearing impairment     [place] bilateral hearing aids   • OAB (overactive bladder)    • Other and unspecified hyperlipidemia    • Personal history of malignant neoplasm of p tablet, Take 1 tablet (10 mg total) by mouth every 6 (six) hours as needed for Nausea., Disp: 30 tablet, Rfl: 3  •  Ondansetron HCl (ZOFRAN) 8 MG tablet, Take 1 tablet (8 mg total) by mouth every 8 (eight) hours as needed for Nausea., Disp: 30 tablet, Rfl: Systems:  A comprehensive 14 point review of systems was completed. Pertinent positives and negatives noted in the the HPI. Performance Status: ECOG 1    Physical Examination:  General: Patient is alert and oriented x 3, not in acute distress.   Vital S 01/18/2018  Value: 26          Ref range: 17 - 63 U/L        Status: Final  Bilirubin, Total                              Date: 01/18/2018  Value: 0.4         Ref range: 0.1 - 2.0 mg/dL    Status: Final  Total Protein                                 Date: Ref range: 31.0 - 37.0 g/dL   Status: Final  RDW                                           Date: 01/18/2018  Value: 13.9        Ref range: 11.5 - 16.0 %      Status: Final  RDW-SD                                        Date: 01/18/2018  Value: 48.6* Status: Final  ------------    Impression/Plan:  1. SCLC: tolerating treatment well with decrease in back pain  2. Elevated Creat: continues to improve. 3. Mild hyponatremia: no salt restriction  4.  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

## 2018-01-18 NOTE — PROGRESS NOTES
Pt here for day 8 f/u. Pt's energy level is good per pt, but lower per wife. Appetite is good. Pt has occasional \"spasm like\" aches in back, no meds needed. No bowel issues. Pt has occasional heartburn issues, takes prilosec and zofran with relief.      E

## 2018-01-22 NOTE — TELEPHONE ENCOUNTER
NEEDS ICD 10 CODE FOR STRIPS SENT TO THEM. NEED TO RESEND WITH CODE. PLS RE-SEND THE SCRIPT TO THEM.

## 2018-01-31 NOTE — PROGRESS NOTES
Pt here for MD f/u visit and C2 chemotherapy. Energy level fair. Appetite very good. Pt reports occasional \"spasm like\" aches in back. No bowel issues. Some heartburn issues, takes prilosec and zofran with relief.    Education Record    Learner:  Patient

## 2018-01-31 NOTE — PATIENT INSTRUCTIONS
To reach Dr Shweta duarte during business hours, please call [phone]. After hours, including weekends, evenings, and holidays, please call the main number [phone] for emergent needs. none

## 2018-01-31 NOTE — PROGRESS NOTES
Cancer Center Progress Note  Patient Name: Ani Robles   YOB: 1935   Medical Record Number: [id number]   CSN: [id number]   Attending Physician: Ira Johnson M.D.        Date of Visit: 1/31/2018    Chief Complaint:  No chief compla Back problem    • Cancer [facility])     prostate   • Cancer ([facility] [address]     lung   • Esophageal reflux    • Glaucoma     Has high pressure for last 25 years    • Gout    • Hearing impairment     [place] bilateral hearing aids   • OAB (overactive bladder)    • Other and u ONCE DAILY, Disp: 100 strip, Rfl: 5  •  guaiFENesin 400 MG Oral Tab, Take 1 tablet by mouth 2 (two) times daily. , Disp: , Rfl:   •  Prochlorperazine Maleate (COMPAZINE) 10 mg tablet, Take 1 tablet (10 mg total) by mouth every 6 (six) hours as needed for Na (two) times daily as needed.  ), Disp: 2 Bottle, Rfl: 6    Allergies:  No Known Allergies     Review of Systems:    Constitutional No fevers, chills, night sweats, excessive fatigue or weight loss. Eyes No significant visual difficulties. No diplopia.  No Psychiatric Normal - A&Ox3. Coherent speech. Verbalizes understanding of our discussions today.          Laboratory:    Recent Labs   01/31/18  0829   RBC 3.68 L   HGB 11.3 L   HCT 35.3 L   MCV 95.9   MCH 30.7   MCHC 32.0   RDW 14.9   NEPRELIM 1.51   WBC The diagnosis, prognosis, and general treatment was explained to the patient and the family. Electronically Signed by: NADINE Mendes [facility]

## 2018-02-01 NOTE — PROGRESS NOTES
Education Record    Learner:  Patient and Spouse    Disease / Diagnosis: lung ca  Barriers / Limitations:  None   Comments:    Method:  Discussion and Reinforcement   Comments:    General Topics:  Medication, Side effects and symptom management, Plan of ca

## 2018-02-19 PROBLEM — R07.9 ACUTE CHEST PAIN: Status: ACTIVE | Noted: 2018-01-01

## 2018-02-19 PROBLEM — R77.8 ELEVATED TROPONIN: Status: ACTIVE | Noted: 2018-01-01

## 2018-02-19 PROBLEM — D64.9 ANEMIA: Status: ACTIVE | Noted: 2018-01-01

## 2018-02-19 PROBLEM — E87.1 HYPONATREMIA: Status: ACTIVE | Noted: 2018-01-01

## 2018-02-19 NOTE — TELEPHONE ENCOUNTER
Pt l/m on triage line, stating \"I have a throbbing in my chest when I lay down, it started last night, I took my antinausea meds that worked last time and it didn't help, it goes away when I stand or sit and this continued all night, there is a pulsing I

## 2018-02-19 NOTE — CONSULTS
[facility] Cardiology Consultation Anabell Jean MD    The patient was interviewed, examined, the chart was reviewed and the consult was dictated. This is a 80year old male with a chief complaint of chest pain.     Impression:  1.chest pain - atypical, abnorma

## 2018-02-20 NOTE — ED PROVIDER NOTES
Patient Seen in: [facility] 2ne-a    History   Patient presents with:  Chest Pain Angina (cardiovascular)    Stated Complaint: chest pain    HPI    This is an 30-year-old male complaint of chest pain patient has a history of small cell lung cancer c date: TOTAL HIP REPLACEMENT Bilateral    Family history reviewed and is not pertinent to presenting problem.     Smoking status: Former Smoker                                                              Packs/day: 1.50      Years: 25.00        Types: Cigar to contribute to the exclusion of venous thromboembolism with a negative predictive value of approximately 95% when results are used as part of the total clinical evaluation of the patient.    MANUAL DIFFERENTIAL - Abnormal; Notable for the following:     M admitted his troponin  Was slightly elevated at 0.051. The BUN 23 creatinine 1.39.   Sodium was 129      Admission disposition: 2/19/2018  6:31 PM           Disposition and Plan     Clinical Impression:  Acute chest pain  (primary encounter diagnosis)  Kallie

## 2018-02-20 NOTE — DISCHARGE SUMMARY
[facility] [place] HOSPITALIST  DISCHARGE SUMMARY     Stefani Keane Patient Status:  Inpatient    3/9/1935 MRN [id number]   [facility] 2NE-A Attending Dena Barrett MD   Hosp Day # 1 PCP Cecille Coombs DO     Date of Admission: 2018  Date o Procedures during hospitalization:   • none    Incidental or significant findings and recommendations (brief descriptions):  • none    Lab/Test results pending at Discharge:   · none    Consultants:  • Cardiology/Oncology    Discharge Medication List: daily. Refills:  0     Losartan Potassium-HCTZ 100-12.5 MG Tabs  Commonly known as:  HYZAAR      Take 1 tablet by mouth daily. Quantity:  90 tablet  Refills:  3            [place]    Follow-up appointment:   No follow-up provider specified.

## 2018-02-20 NOTE — PROGRESS NOTES
Pt admitted via transport cart. Oriented to unit. Safety precautions initiated. Call light in reach. Bed in lowest position. Admission navigator complete.

## 2018-02-20 NOTE — PROGRESS NOTES
[facility] HOSPITAL[facility]  Progress Note     [address] Patient Status:  Inpatient    3/9/1935 MRN [id number]   [facility] 2NE-A Attending Ricky Das MD   Hosp Day # 1 PCP Vinod Brooks DO     Chief Complaint: chest pain    S: Lisa Badillo Oral Daily   • Pantoprazole Sodium  20 mg Oral QAM AC   • Azelastine HCl  1 spray Nasal BID   • Fluticasone Propionate  1 spray Each Nare Daily   • Pravastatin Sodium  20 mg Oral Nightly   • GuaiFENesin ER  600 mg Oral BID   • Oxybutynin Chloride ER  15 mg

## 2018-02-20 NOTE — PLAN OF CARE
Alert,oriented x3  Denies chest pain and shortness of breath  Tele SR  On roomair 93% O2 sats  Ambulating in the room  Echo done and reviwed by Dr Tammy Lino in progress  Seen by Dr Mariel Haider Dc instructions provided to pt and spouse, both allison

## 2018-02-20 NOTE — PROGRESS NOTES
[facility] Cardiology Progress Note        [facility] & [facility] Patient Status:  Inpatient    3/9/1935 MRN [id number]   [facility] 2NE-A Attending Mikki Muhammad MD   Hosp Day # 1 PCP Jewell Pavon DO     Subjective: 9.0  8.3   GLU  123*  113*       Recent Labs   Lab  02/19/18   1622   ALT  55   AST  27   ALB  2.8*       Recent Labs   Lab  02/19/18   1622  02/20/18   0015  02/20/18   0534   TROP  0.051*  <0.046  <0.046       No results for input(s): PTP, INR in the las

## 2018-02-20 NOTE — CM/SW NOTE
Patient failed inpatient criteria. Second level of review completed and supports observation.  UR committee in agreement.  Discussed with Dr. Mark Singh who approves observation status.  Observation order written, Unable to deliver [address] letter, patient discharg

## 2018-02-20 NOTE — H&P
[facility]ITAL[facility]  History and Physical     [facility]é [facility] Status:  [facility]patient    3/9/1935 MRN [id number]   [facility] 2NE-A Attending Kashif [place] Day # 0 PCP Lina Murphy DO     Chief Complaint: George Ramos HISTORY      Comment: prostate surgery, cancer removed  No date: TOTAL HIP REPLACEMENT Bilateral    Social History:  reports that he quit smoking about 25 years ago. His smoking use included Cigarettes. He has a 37.50 pack-year smoking history.  He has neve nondistended. Positive bowel sounds. No rebound, guarding or organomegaly. Neurologic: No focal neurological deficits. CNII-XII grossly intact. Musculoskeletal: Moves all extremities. Extremities: No edema or cyanosis.   Integument: No rashes or lesions

## 2018-02-21 NOTE — PROGRESS NOTES
Education Record  Learner:  Patient  Disease / Diagnosis:   Lung cancer  Barriers / Limitations:  None  Method:  Printed material and Reinforcement  General Topics:  Plan of care reviewed; schedule; side effects  Outcome:  Shows understanding and Patient g

## 2018-02-21 NOTE — PROGRESS NOTES
Initial Post Discharge Follow Up   Discharge Date: 2/20/18  Contact Date: 2/21/2018    Consent Verification:  Assessment Completed With: Patient  HIPAA Verified? 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     General: daily. Disp:  Rfl:    Prochlorperazine Maleate (COMPAZINE) 10 mg tablet Take 1 tablet (10 mg total) by mouth every 6 (six) hours as needed for Nausea.  Disp: 30 tablet Rfl: 3   Ondansetron HCl (ZOFRAN) 8 MG tablet Take 1 tablet (8 mg total) by mouth every 8 Appt rescheduled to 3/6/18 with Dr. Anton Youngblood per pt's request.  He denies any barriers to  Keeping/getting to HFU appts.        Your appointments     Date & Time Appointment Department [facility])    Feb 22, 2018  1:30 PM CST CHEMO INFUSION CHEMOTHERAPY with PF T Reviewed/scheduled/rescheduled PCP TCM/HFU appointment with pt:  Yes      Have you made all of your follow up appointments? yes    Is there any reason as to why you cannot make your appointments?    No     NCM Reviewed upcoming Specialist Appt with patient

## 2018-02-21 NOTE — PROGRESS NOTES
Cancer Center Progress Note  Patient Name: Patrica Apley   YOB: 1935   Medical Record Number: [id number]   CSN: [id number]   Attending Physician: Lauryn Lorenzo M.D.        Date of Visit: 2/21/2018    Chief Complaint:  No chief compla 1/1/10   • Back problem    • Cancer [facility])     prostate   • Cancer ([facility] [address]     lung   • Esophageal reflux    • Glaucoma     Has high pressure for last 25 years    • Gout    • Hearing impairment     [place] bilateral hearing aids   • OAB (overactive bladder)    • by mouth daily. , Disp: 90 tablet, Rfl: 3  •  AmLODIPine Besylate 5 MG Oral Tab, Take 5 mg by mouth daily. , Disp: , Rfl:   •  omeprazole 20 MG Oral Capsule Delayed Release, Take 20 mg by mouth 2 (two) times daily before meals. , Disp: , Rfl:   •  Azelastine constipation. NL appetite. Genitorurinary (M) No hematuria, dysuria, abnormal bleeding, or incontinence. Integumentary No rashes or yellowing of the skin   Neurologic No headache, blurred vision, and no areas of focal weakness.     Psychiatric No insomn BILT 0.4  --    TP 7.7  --      Radiology:      Pathology:  Final Diagnosis:   A- EBUS-guided fine needle aspiration, 4L lymph node, direct smears and cell block:   -POSITIVE for malignancy. -Metastatic small cell neuroendocrine carcinoma.  (See comment

## 2018-02-21 NOTE — CONSULTS
[facility]    Report of Consultation    Modesta Cook Patient Status:  Observation    3/9/1935 MRN [id number]   [facility] 2NE-A Attending No att. providers found   Hosp Day # 1 PCP Gadiel Sol DO     Date of Admission:   years    • Gout    • Hearing impairment     [place] bilateral hearing aids   • OAB (overactive bladder)    • Other and unspecified hyperlipidemia    • Personal history of malignant neoplasm of prostate    • Shortness of breath    • Sleep apnea     does not rickey SpO2 93%   BMI 34.33 kg/m²    HEENT: EOMs intact. PERRL. Oropharynx is clear. Neck: No JVD. No palpable lymphadenopathy. Neck is supple. Chest: Clear to auscultation. Heart: Regular rate and rhythm.     Abdomen: Soft, non tender with good bowel soun rigidus, left foot     Arthritis, lumbar spine (HCC)     Aortic atherosclerosis (HCC)     Ectatic abdominal aorta (HCC)     Dyspnea     Small cell lung cancer, unspecified laterality (HCC)     Elevated serum creatinine     Hyponatremia     Anemia     Acute

## 2018-03-06 NOTE — PROGRESS NOTES
HPI:    Patrica Apley is a 80year old male here today for hospital follow up. Chest pain is resolved. Reviewed testing in the hospital no pneumonia and no MI found. Cleared by cardiology.   SOB is greatly improved back to normal.  Testing in [place] Suspension 1 spray by Each Nare route daily. simvastatin 10 MG Oral Tab Take 10 mg by mouth nightly. GuaiFENesin  MG Oral Tablet 12 Hr Take 600 mg by mouth 2 (two) times daily.    acetaminophen 325 MG Oral Tab Take 1,250 mg by mouth every 12 (twel reports that he does not drink alcohol or use drugs.      ROS:   GENERAL: weight stable, energy stable, no sweating  SKIN: denies any unusual skin lesions  EYES: denies blurred vision or double vision  HEENT: denies nasal congestion, sinus pain or ST  LUNGS encounter.       Meds & Refills for this Visit:    No prescriptions requested or ordered in this encounter       Imaging & Consults:  None         Transitional Care Management Certification:  I certify that the following are true:  Communication with the pa

## 2018-03-14 NOTE — PROGRESS NOTES
Cancer Center Progress Note  Patient Name: Marco Antonio Lechuga   YOB: 1935   Medical Record Number: [id number]   CSN: [id number]   Attending Physician: Myranda Person M.D.        Date of Visit: 3/14/2018      Chief Complaint:  No chief comp • Acute, but ill-defined, cerebrovascular disease 1/1/10   • Back problem    • Cancer [facility])     prostate   [facility]     lung   • Esophageal reflux    • Glaucoma     Has high pressure for last 25 years    • Gout    • Hearing impairment     [place] bilat ALLOPURINOL 300 MG Oral Tab, TAKE ONE TABLET BY MOUTH ONCE DAILY, Disp: 90 tablet, Rfl: 1  •  DiphenhydrAMINE HCl 25 MG Oral Tab, Take 25 mg by mouth 2 (two) times daily. , Disp: , Rfl:   •  aspirin 81 MG Oral Tab EC, Take 1 tablet (81 mg total) by mouth da Pleuritic chest pain, cough or hemoptysis. Cardiovascular No anginal chest pain, palpitations or orthopnea. Gastrointestinal No nausea, vomiting, diarrhea, GI bleeding, or constipation. Genitorurinary (M) No hematuria, dysuria, or incontinence.  No ab Diagnosis:   A- EBUS-guided fine needle aspiration, 4L lymph node, direct smears and cell block:   -POSITIVE for malignancy. -Metastatic small cell neuroendocrine carcinoma.  (See comment.)       B- Bronchoalveolar lavage, lingula, cytospins and cell bloc Guidelines: NCCN    Electronically Signed by: Ira Johnson M.D.   [facility]

## 2018-03-21 NOTE — PROGRESS NOTES
Education Record    Learner:  Patient    Disease / Rosalia Hurst cancer    Barriers / Limitations:  None    Method:  Brief focused, printed material and  reinforcement    General Topics:  Plan of care reviewed    Outcome:  Shows understanding

## 2018-03-22 NOTE — PATIENT INSTRUCTIONS
For Dr. Collins Cam nurse line, call  [phone] with any questions or concerns Monday through Friday 8:00 to 4:30.   After hours or weekends for emergent needs [phone]

## 2018-03-22 NOTE — PROGRESS NOTES
Education Record    Learner:  Patient    Disease / Caroline figueroa    Barriers / Limitations:  None   Comments:    Method:  Brief focused and Printed material   Comments:    General Topics:  Medication, Side effects and symptom management and Plan of car

## 2018-03-28 NOTE — TELEPHONE ENCOUNTER
Last OV YP 3-6-18. Under Dr. Belle olivarez, just had chemo treatment. Spoke pt has had sore throat started yesterday, mostly at night with sleep \"gets dry mouth\". Felt better after fluids. Denies congestion, Pt states he is on Claritin daily.  Has GERD

## 2018-03-28 NOTE — TELEPHONE ENCOUNTER
Pt's wife called to request a call back from the nurse to discuss pt's symptoms she wants to know if maybe pt's pcp can prescribe something for his sore throat, pt just had chemo treatment and he is weaker right now if an appt can be avoid.  Please call and

## 2018-04-11 NOTE — PROGRESS NOTES
Education Record  Learner:  Patient  Disease / Diagnosis:   Lung cancer  Barriers / Limitations:  None  Method:  Printed material and Reinforcement  General Topics:  Plan of care reviewed; labs; schedule; side effects  Outcome:  Shows understanding and Pat

## 2018-04-11 NOTE — PROGRESS NOTES
Cancer Center Progress Note  Patient Name: Ashly Person   YOB: 1935   Medical Record Number: [id number]   CSN: [id number]   Attending Physician: Dennis Butler M.D.        Date of Visit: 4/11/2018    Chief Complaint:  Patient present • Cancer [facility])     lung   • Esophageal reflux    • Glaucoma     Has high pressure for last 25 years    • Gout    • Hearing impairment     Kake bilateral hearing aids   • OAB (overactive bladder)    • Other and unspecified hyperlipidemia    • Personal hist TABLET BY MOUTH ONCE DAILY, Disp: 90 tablet, Rfl: 1  •  aspirin 81 MG Oral Tab EC, Take 1 tablet (81 mg total) by mouth daily. , Disp: 90 tablet, Rfl: 3  •  AmLODIPine Besylate 5 MG Oral Tab, Take 5 mg by mouth daily. , Disp: , Rfl:   •  omeprazole 20 MG Ora diarrhea, GI bleeding, or constipation. NL appetite, No Early Satiety. Genitorurinary No hematuria, dysuria, abnormal bleeding. Integumentary No rashes, No yellowing. Neurologic No headache, blurred vision, and no areas of focal weakness.  Normal gait 0.2   TP 7.2       Radiology:  PET: CONCLUSION:       1. Decrease in size of lingular mass with elevated FDG consistent with improving lingular small-cell cancer. 2.  Improved mediastinal lymphadenopathy with minimal residual uptake in the AP window. symptoms. Anemia chemo: Stable    Planned Follow Up:  Chemo next week. MD CLL and chemo 4 weeks    I spent * minutes face to face with the patient. More than 50% of that time was spent counseling the patient and/or on coordination of care.   The diagnos

## 2018-04-11 NOTE — PROGRESS NOTES
Pt here for follow up and treatment. See toxicities. Pt complains of a little sore throat for the past couple of weeks. Pt complains of left ear pain.

## 2018-04-12 NOTE — PROGRESS NOTES
Education Record  Learner:  Patient  Disease / Diagnosis:   Lung cancer  Barriers / Limitations:  None  Method:  Printed material and Reinforcement  General Topics:  Plan of care reviewed; side effect; schedule  Outcome:  Shows understanding and Patient gi

## 2018-04-13 NOTE — PROGRESS NOTES
Pt arrived this am stating he was feeling generalized swelling in face, abdomen and in BLE. Pt and wife requesting lasix. Pt's weight up about 3 lbs since day 1. DW Dr. Areli Serra, ok to give Lasix 20mg PO x1 before discharge.

## 2018-04-13 NOTE — PROGRESS NOTES
Education Record    Learner:  Patient    Disease / Diandra figueroa    Barriers / Limitations:  None   Comments:    Method:  Reinforcement   Comments:    General Topics:  Side effects and symptom management and Plan of care reviewed   Comments:    Outcome

## 2018-04-24 PROBLEM — T45.1X5A CHEMOTHERAPY-INDUCED NEUTROPENIA (HCC): Status: ACTIVE | Noted: 2018-01-01

## 2018-04-24 PROBLEM — D64.9 ANEMIA, UNSPECIFIED TYPE: Status: ACTIVE | Noted: 2018-01-01

## 2018-04-24 PROBLEM — D69.6 THROMBOCYTOPENIA (HCC): Status: ACTIVE | Noted: 2018-01-01

## 2018-04-24 PROBLEM — D70.1 CHEMOTHERAPY-INDUCED NEUTROPENIA (HCC): Status: ACTIVE | Noted: 2018-01-01

## 2018-04-24 PROBLEM — N18.30 STAGE 3 CHRONIC KIDNEY DISEASE (HCC): Chronic | Status: ACTIVE | Noted: 2018-01-01

## 2018-04-24 PROBLEM — K92.2 GASTROINTESTINAL HEMORRHAGE, UNSPECIFIED GASTROINTESTINAL HEMORRHAGE TYPE: Status: ACTIVE | Noted: 2018-01-01

## 2018-04-24 PROBLEM — K92.2 GASTROINTESTINAL HEMORRHAGE: Status: ACTIVE | Noted: 2018-01-01

## 2018-04-24 PROBLEM — R00.0 TACHYCARDIA: Status: ACTIVE | Noted: 2018-01-01

## 2018-04-24 NOTE — TELEPHONE ENCOUNTER
Patient with bloody diarrheaa, spoke with Dr Jia Chaudhari and patient directed to ER for evaluation

## 2018-04-24 NOTE — ED NOTES
Report given to Sadie dela cruz RN.  Informed Sadie dela cruz that PT will be transported with pre-drawn type and screen, to be delivered to lab upon arrival.

## 2018-04-24 NOTE — PROGRESS NOTES
[facility] Pharmacy Note:  Renal Dose Adjustment for Metoclopramide (REGLAN)    Ashly Person has been prescribed Metoclopramide (REGLAN) 10 mg IV every 8 hours as needed for nausea/vomiting.     Estimated Creatinine Clearance: 32.1 mL/min (A) (based on SCr of

## 2018-04-24 NOTE — CONSULTS
Critical Care Consult     Assessment / Plan:  Acute blood loss anemia - due to GIB.  Also has decreased counts due to chemotherapy  - trend H+H  - transfuse to keep hemoglobin >7, platelets >45  - hold all anticoagulants and antiplatelet therapy  - large lane (EGD);                  Surgeon: Roxanne Gardiner MD;  [place] [facility] [place]                ENDOSCOPY  1/1/64: HERNIA SURGERY  No date: OTHER      Comment: Pilonidal cyst removed  1/1/00: OTHER SURGICAL HISTORY      Comment: prostate surgery, cancer removed  No keegan Outpatient Prescriptions Marked as Taking for the 4/24/18 encounter [facility] Encounter): ALLOPURINOL 300 MG Oral Tab TAKE ONE TABLET BY MOUTH ONCE DAILY Disp: 90 tablet Rfl: 1   aspirin 81 MG Oral Tab EC Take 1 tablet (81 mg total) by mouth daily. clear  Respiratory: clear breath sounds bilaterally  Cardiovascular: RRR, no MRG  Abdo: soft, NTND  Ext: no edema  Skin: no rashes  Mental status: alert and interactive, answers questions appropriately    Labs:  Reviewed in EMR    Inpatient Medications:  R

## 2018-04-24 NOTE — ED PROVIDER NOTES
Patient Seen in: [facility] [place]    History   Patient presents with:  GI Bleeding (gastrointestinal)    Stated Complaint: rectal bleeding, diff breathing    HPI    80-year-old male presents reporting bloody stool.   He reports he [facility]                ENDOSCOPY  1/1/64: HERNIA SURGERY  No date: OTHER      Comment: Pilonidal cyst removed  1/1/00: OTHER SURGICAL HISTORY      Comment: prostate surgery, cancer removed  No date: TOTAL HIP REPLACEMENT Bilateral        Smoking status: Former Sodium 133 (*)     All other components within normal limits   PTT, ACTIVATED - Abnormal; Notable for the following:     PTT 37.2 (*)     All other components within normal limits    Narrative:      The aPTT Heparin Therapeutic Range is approximately 65 evidence of acute ischemia           ED Course as of Apr 24 1420  ------------------------------------------------------------  A total of 39 minutes of critical care time (exclusive of billable procedures) was administered to manage the patient's critical List        Present on Admission  Date Reviewed: 3/6/2018          ICD-10-CM Noted POA    Gastrointestinal hemorrhage K92.2 4/24/2018 Unknown

## 2018-04-24 NOTE — CONSULTS
[facility]    Report of Consultation    Juana Wiseman Patient Status:  Inpatient    3/9/1935 MRN [id number]   [facility] 4SW-A Attending [facility] Day # 0 PCP Ector De La O DO     Date of Admission:   on\" since then. Better with imodium. No fever. He did not notice any blood in the stool then, but he admits to not looking for it.   Yesterday, he noticed that the diarrhea was coming urgently and was difficult to control and he developed significant fa prostate surgery, cancer removed  No date: TOTAL HIP REPLACEMENT Bilateral  Family History   Problem Relation Age of Onset   • Heart Disease Mother    • Cancer Neg       reports that he quit smoking about 26 years ago. His smoking use included Cigarettes. (Temporal)   Resp 20   Ht 1.626 m (5' 4\")   Wt 90.7 kg (200 lb)   SpO2 100%   BMI 34.33 kg/m²    HEENT: EOMs intact. PERRL. Oropharynx is clear. Neck: No JVD. No palpable lymphadenopathy. Neck is supple. Chest: Clear to auscultation.    Heart: Regular long-term current use of insulin (HCC)     Hallux rigidus, left foot     Arthritis, lumbar spine (HCC)     Aortic atherosclerosis (HCC)     Ectatic abdominal aorta (HCC)     Dyspnea     Small cell lung cancer, unspecified laterality (HCC)     Elevated seru

## 2018-04-25 NOTE — CDS QUERY
Clinical Significance – Hematology Results  Monica Florentino  Dear Doctor:  Clinical information (provided below) includes documentation of hematology results that are less than the normal range.  For accurate ICD-10-CM code assignm

## 2018-04-25 NOTE — PROGRESS NOTES
[facility]  Progress Note     [address] Patient Status:  Inpatient    3/9/1935 MRN [id number]   [facility] 4SW-A Attending Dawna Suarez MD   Hosp Day # 1 PCP Joseph Solitario DO     Chief Complaint: fatigue    S: Patient rep Nasal BID   • Fluticasone Propionate  1 spray Each Nare Daily   • Saline Nasal Spray  1 spray Nasal Daily   • pantoprazole (PROTONIX) IV push  40 mg Intravenous Q12H   • filgrastim-sndz  480 mcg [email or web address] [email or web address]   • Insulin Aspart Pen  1-10 Units Justin

## 2018-04-25 NOTE — CONSULTS
[facility]    Gastroenterology Initial Consultation    Monicarica Apley Patient Status:  Inpatient    3/9/1935 MRN [id number]   [facility] 4SW-A Attending Montserrat Plummer MD   Hosp Day # 1 PCP George Oakes DO       Reason for Consult Surgeon: Low Maza MD;  Location: [facility]                ENDOSCOPY  1/1/64: HERNIA SURGERY  No date: OTHER      Comment: Pilonidal cyst removed  1/1/00: OTHER SURGICAL HISTORY      Comment: prostate surgery, cancer removed  No date: [address] [facility] tablet, 8 tablet, Oral, Q15 Min PRN  •  Insulin Aspart Pen (NOVOLOG) 100 UNIT/ML flexpen 1-10 Units, 1-10 Units, Subcutaneous, TID AC and HS    Review of Systems:    Except for what is noted on the HPI the remainder 10 point review of systems is negative. rhythm, peripheral pulses and rate  Respiratory: Clear to auscultation bilaterally  Abdomen: Soft, non-tender, non-guarded, non-distended, normal active bowel sounds  Extremities: No edema bilateral lower extremities.  No cyanosis  Skin: Normal color, no ra

## 2018-04-25 NOTE — PLAN OF CARE
HEMATOLOGIC - ADULT    • Free from bleeding injury Progressing        Patient/Family Goals    • Patient/Family Long Term Goal Progressing    • Patient/Family Short Term Goal Progressing          Received patient alert and oriented x 3.  Follows all commands

## 2018-04-25 NOTE — H&P
[facility]  History and Physical     [facility] Patient Status:  Inpatient    3/9/1935 MRN [id number]   [facility]SW[facility] Day # 0 PCP Gadiel Sol DO     Chief Complaint: Jeniffer Thorne COLONOSCOPY;  Surgeon: Tyrese Peres MD;  Location: [facility] ENDOSCOPY  2/21/2017: EGD N/A      Comment: Procedure: ESOPHAGOGASTRODUODENOSCOPY (EGD);                  Surgeon: Jesus Felix MD;  Location: [facility]                ENDOSCOPY  1/1/64 Oral Cap Take by mouth. Disp:  Rfl:    Azelastine HCl 0.1 % Nasal Solution 1 spray by Nasal route 2 (two) times daily. Disp:  Rfl:    Fluticasone Propionate 50 MCG/ACT Nasal Suspension 1 spray by Each Nare route daily.  Disp:  Rfl:    GuaiFENesin  MG 6.5       Estimated Creatinine Clearance: 32.1 mL/min (A) (based on SCr of 1.46 mg/dL (H)). Recent Labs   Lab  04/24/18   1349   PTP  14.1   INR  1.08       No results for input(s): TROP, CK in the last 72 hours.     Imaging: Imaging data reviewed in Epi

## 2018-04-25 NOTE — PROGRESS NOTES
04/25/18 1652   Clinical Encounter Type   Visited With Patient   Continue Visiting No   Crisis Visit Critical care   Referral From Patient   Referral To    Holiness Encounters   Holiness Needs Prayer   [facility] Requests During Visit / [facility]

## 2018-04-25 NOTE — PROGRESS NOTES
[facility]:  [facility]    3/9/1935 MRN [id number]   [facility] 4SW-A Attending Montserrat Plummer MD   Hosp Day # 1 PCP George Oakes, DO     Pulm / Critical Care Progress Note     S: pt states he feels Labs   Lab  04/24/18   1349   INR  1.08         Recent Labs   Lab  04/24/18   1349   NA  133*   K  3.8   CL  101   CO2  22.0   BUN  39*       CREATININE (mg/dL)   Date Value   04/28/2014 1.23 (H)   01/10/2014 1.10   03/25/2013 1.2   12/20/2011 1.13   09/14

## 2018-04-25 NOTE — PROGRESS NOTES
[facility]    Progress Note    [address] Patient Status:  Inpatient    3/9/1935 MRN [id number]   [facility] 4SW-A Attending David Hand MD   [facility] Day [facility] PCP Jewell Pavon DO     Subjective:  [address] is a(n) 80 disease ([facility] [address]     Lumbar disc disease with radiculopathy     Carpal tunnel syndrome, bilateral                 GLOBAL EXP 9-26-16 / RIGHT CTR / BAM      S/P lumbar fusion     Benign essential HTN     Dyslipidemia     Gout     YANIRA (obstructive sleep apnea)

## 2018-04-25 NOTE — PLAN OF CARE
CARDIOVASCULAR - ADULT    • Maintains optimal cardiac output and hemodynamic stability Not Progressing    • Absence of cardiac arrhythmias or at baseline Not Progressing        GASTROINTESTINAL - ADULT    • Maintains adequate nutritional intake (undernouri monitor. 0200 Dr Crystal Sanchez noted of the post transfusion ( 1 U PRBC and 1 U Platelet)  Level of HGb at 4.2 Plt 44 and ANC at 0.8. Ordered to give 2 u PRBC and 1 u Platelet; to re check level post transfusion then H and H q 8. No active bleeding noted. pt r

## 2018-04-26 NOTE — PROGRESS NOTES
04/26/18 0913   Clinical Encounter Type   Visited With Patient   Routine Visit Follow-up   Sacramental Encounters   Sacrament of Sick-Anointing Anointed   The patient was seen by Lucas Dalton.  Received prayer, Scripture, support and [employer]

## 2018-04-26 NOTE — PLAN OF CARE
Pt transferred to room 431 via wheelchair. Comfortable for transfer. Pt spoke with his wife and aware of room change. Abraham James arrived as patient was leaving, sent with pt, receiving RN aware.

## 2018-04-26 NOTE — ANESTHESIA PREPROCEDURE EVALUATION
PRE-OP EVALUATION    Patient Name: Angel Stewart    Pre-op Diagnosis: anemia,melena    Procedure(s):  ESOPHAGOGASTRODUODENOSCOPY WITH MAC    Surgeon(s) and Role:     * Jesse Jolley, DO - Primary    Pre-op vitals reviewed.   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   Smoking history              (+) shortness of decreased breath sounds         Other findings            ASA: 3   Plan: MAC  NPO status verified and patient meets guidelines. Comment: MAC explained to patient. Risks/benefits explained. Agrees to proceed. Consent signed.   Plan/risks discussed wit

## 2018-04-26 NOTE — OPERATIVE REPORT
[address] [facility] Patient Status:  Inpatient    3/9/1935 MRN [id number]   [facility] ENDOSCOPY Attending Kristina Jasso MD   [facility] Day # 2 PCP Ele White DO       PREOPERATIVE DIAGNOSIS/INDICATION: Anemia, Rectal bleeding [facility]

## 2018-04-26 NOTE — DISCHARGE SUMMARY
[facility] HOSPITALIST  DISCHARGE SUMMARY     Shiraz Santoyo Patient Status:  Inpatient    3/9/1935 MRN [id number]   [facility] 4SW-A Attending Odilon Cai MD   [facility] Day # 3 EMMANUEL Fernandez DO     Date of Admission: 2018  Date of they were getting more frequent and patient was starting to get some shortness of breath fatigue and weakness. Patient describes the stools as looking like cooked hamburger patient denies any history of ulcers. Patient denies any pain when he defecates. DAILY   Quantity:  90 tablet  Refills:  1     AmLODIPine Besylate 5 MG Tabs  Commonly known as:  NORVASC      Take 5 mg by mouth daily.    Refills:  0     Azelastine HCl 0.1 % Soln  Commonly known as:  ASTELIN      1 spray by Nasal route 2 (two) times daily S2. Regular rate and rhythm. No murmurs, rubs or gallops. Abdomen: Soft, nontender, nondistended. Neurologic: No focal neurological deficits. Musculoskeletal: Moves all extremities. Extremities: No edema.   ------------------------------------------

## 2018-04-26 NOTE — PROGRESS NOTES
[facility]    Progress Note    [address] Patient Status:  Inpatient    3/9/1935 MRN [id number]   [facility] 4SW-A Attending Nay Cervantes MD   [facility] Day # 2 PCP Kamryn Parrish DO     Subjective:  [address] is a(n) 02 hypertension     Reflux esophagitis     Reactive airways dysfunction syndrome (HCC)     Peripheral vascular disease (HCC)     Lumbar disc disease with radiculopathy     Carpal tunnel syndrome, bilateral                 GLOBAL EXP 9-26-16 / RIGHT CTR / BAM NADINE Ray Director, [facility]

## 2018-04-26 NOTE — PROGRESS NOTES
Critical Care Progress Note     Assessment / Plan:  1. Acute blood loss anemia - due to GIB and chemotherapy  - trend H+H  - transfuse to keep hemoglobin >7, platelets >52  - hold all anticoagulants and antiplatelet therapy  2.  GI bleed  - EGD planned for

## 2018-04-26 NOTE — PROGRESS NOTES
[facility]  Progress Note     [address] Patient Status:  Inpatient    3/9/1935 MRN [id number]   [facility] 4SW-A Attending Kitty Dorsey MD   [facility] Day # 2 PCP Ivory Flores DO     Chief Complaint: fatigue    S: Patient repo mg/dL). Recent Labs   Lab  04/24/18   1349  04/25/18   1130  04/26/18   0502   PTP  14.1  14.0  13.8   INR  1.08  1.04  1.02       No results for input(s): TROP, CK in the last 72 hours. Imaging: Imaging data reviewed in Epic.     Medications: 04/26/18   0502   RBC  1.34*  2.24*   --    --   2.41*   HGB  4.2*  6.9*  6.5*  7.3*  7.2*   HCT  12.4*  19.6*   --    --   21.5*   MCV  92.5  87.5   --    --   89.2   MCH  31.3  30.8   --    --   29.9   MCHC  33.9  35.2   --    --   33.5   RDW  21.0*  18.

## 2018-04-26 NOTE — PROGRESS NOTES
Received pt. From ICU per w/c;alert and oriented x4; Had bowel movement small pebble like dark stools;   Noted PC-sounds congested and rally and able to expectorate clear white phlegm

## 2018-04-26 NOTE — PLAN OF CARE
Received pt this am aox4. To EGD late this morning. Pt w/o bm this am. Pt ambulating to bathroom/chair with min assist today.      GASTROINTESTINAL - ADULT    • Minimal or absence of nausea and vomiting Progressing    • Maintains or returns to baseline maddison

## 2018-04-26 NOTE — PLAN OF CARE
Assumed care at [address] [place] [address]. A&Ox4. Room air. SR.  VSS. Hgb <7 --- 1 unit RBC ordered and transfused. Post transfusion hgb=7.3. NPO. No stool overnight -- still need sample for c diff.       RESPIRATORY - ADULT    • Achieves optimal ventilation and oxygena

## 2018-04-27 NOTE — PROGRESS NOTES
[facility]    Progress Note    [address] Patient Status:  Inpatient    3/9/1935 MRN [id number]   [facility] 4SW-A Attending Olya Carvajal MD   [address] Day # 3 PCP Gadiel Sol DO     Subjective:  [address] is a(n) 80 lumbar spine ([facility] [address]     Aortic atherosclerosis (HCC)     Ectatic abdominal aorta (HCC)     Dyspnea     Small cell lung cancer, unspecified laterality (HCC)     Elevated serum creatinine     Hyponatremia     Anemia     Acute chest pain     Elevated troponin

## 2018-04-27 NOTE — PLAN OF CARE
CARDIOVASCULAR - ADULT    • Maintains optimal cardiac output and hemodynamic stability Progressing        HEMATOLOGIC - ADULT    • Maintains hematologic stability Progressing        RESPIRATORY - ADULT    • Achieves optimal ventilation and oxygenation Prog

## 2018-04-27 NOTE — PLAN OF CARE
Pt ok for discharge ok from pulm and Gi s/o. Pt left unit d/c instructions given to pt and wife. Pt instructed to follow up with Dr Bryant Maldonado and Pulmonary outpatient.  Left unit with belongings

## 2018-04-27 NOTE — PROGRESS NOTES
Patient seen and examined  No new complaints  No pain, nausea, vomiting, diarrhea  No dark stools  No chest pain or shortness of breath  /50   Pulse 84   Temp 98.3 °F (36.8 °C) (Oral)   Resp 18   Ht 5' 4\" (1.626 m)   Wt 197 lb 4.8 oz (89.5 kg)   SpO

## 2018-04-27 NOTE — PROGRESS NOTES
Pulmonary Progress Note     Assessment / Plan:  1. Acute blood loss anemia - due to GIB and chemotherapy  - trend H+H  - transfuse to keep hemoglobin >7, platelets >48  - hold all anticoagulants and antiplatelet therapy  2.  GI bleed  - s/p EGD that was nor

## 2018-04-27 NOTE — CONSULTS
[facility] Pharmacy Note:  Renal Dose Adjustment for Metoclopramide (REGLAN)    Samantha Kim has been prescribed Metoclopramide (REGLAN) 5 mg every 8 hours as needed for nausea    Estimated Creatinine Clearance: 49.3 mL/min (based on SCr of 0.95 mg/dL).     H

## 2018-05-01 NOTE — TELEPHONE ENCOUNTER
Spoke to pt for TCM today. Pt does not have HFU appt scheduled at this time. TCM/HFU appt recommended by 5/4/18 as pt is a high risk for readmission. Please advise.     BOOK BY DATE (last date for TCM): 5/11/18    TRIAGE:  Please f/u with pt and try to g

## 2018-05-01 NOTE — PROGRESS NOTES
Initial Post Discharge Follow Up   Discharge Date: 4/27/18  Contact Date: 4/30/2018    Consent Verification:  Assessment Completed With: Patient  HIPAA Verified?   Yes    Discharge Dx:  GI bleed s/p EGD normal, Acute blood loss Anemia/Thrombocytopenia 2/ 10:30 AM CDT EXAM-ESTABLISHED with VEENA Russo [facility] DERMATOLOGY -- [facility] [place] ([address])    Oct 12, 2018 11:30 AM CDT FOLLOW UP with MD Marleen Gurrola 2 Urology [facility] [place] at [address])        [address] [facility]

## 2018-05-02 NOTE — PROGRESS NOTES
Cancer Center Progress Note  Patient Name: Harsha Fuentes   YOB: 1935   Medical Record Number: [id number]   CSN: [id number]   Attending Physician: Gio Nolen M.D.        Date of Visit: 5/2/2018      Chief Complaint:  Patient presen of edema and dyspnea with cough.         Performance Status:  ECOG 2    Past Medical History:  Past Medical History:   Diagnosis Date   • Acute, but ill-defined, cerebrovascular disease 1/1/10   • Back problem    • Cancer [facility])     prostate   • [facility] Exercise Yes     Social History Narrative   None on file       Current Medications:    Current Outpatient Prescriptions:   •  Acetaminophen ER (TYLENOL ARTHRITIS PAIN) 650 MG Oral Tab CR, Take 1,300 mg by mouth 2 (two) times daily. , Disp: , Rfl:   •  Lo palpitations or orthopnea. Gastrointestinal No nausea, vomiting, diarrhea, GI bleeding, or constipation. NL appetite. Genitorurinary  No hematuria, dysuria, abnormal bleeding, or incontinence.    Integumentary No rashes or yellowing of the skin   Neurol L   GFRNAA 44 L   CA 7.5 L   ALB 2.5 L    L   K 4.2      CO2 26.0   ALKPHO 113   AST 14 L   ALT 17   BILT 0.3   TP 6.5       Radiology:    Pathology:  Final Diagnosis:   A- EBUS-guided fine needle aspiration, 4L lymph node, direct smears and ce Stable    Reactive airway: Trial of Prednisone burst.    LE edema: Trial of lasix prn. Planned Follow Up:  Chemo next week. MD CLL 4 weeks    I spent * minutes face to face with the patient.   More than 50% of that time was spent counseling the patient

## 2018-05-02 NOTE — PROGRESS NOTES
Pt here for 3 week MD f/u and due for chemo. Energy level is improving, appetite is fair. Pt has chronic back pain. Pt took some stool softeners yesterday, had good bowel movement.  Pt notes generalized swelling all over, has been increasing over the last m

## 2018-05-07 PROBLEM — N18.4 CKD (CHRONIC KIDNEY DISEASE) STAGE 4, GFR 15-29 ML/MIN (HCC): Status: ACTIVE | Noted: 2018-01-01

## 2018-05-07 NOTE — PROGRESS NOTES
HPI:    Juana Wiseman is a 80year old male here today for hospital follow up.    He was discharged from Inpatient hospital, [facility] to Home   Admit Date: 4/24/18  Discharge Date: 4/27/18  Hospital Discharge Diagnosis:     1. GI bleed s/p EGD no down, sitting, standing and position. Stiffness is present all day. Risk factors include lack of exercise, obesity and sedentary lifestyle. He has tried muscle relaxant and bed rest for the symptoms. The treatment provided mild relief.      Anemia:  SOB and Cancer ([facility] Esophageal reflux; Glaucoma; Gout; Hearing impairment; OAB (overactive bladder); Other and unspecified hyperlipidemia; Personal history of malignant neoplasm of prostate;  Shortness of breath; Sleep apnea; Type II or unspecified type diabetes developed, well nourished, in no apparent distress  SKIN: no rashes, no suspicious lesions  HEENT: atraumatic, normocephalic, ears and throat are clear  EYES: PERRLA, EOMI, conjunctiva are clear  NECK: supple, no adenopathy, no bruits  CHEST: no chest tend period of discharge to 30 days:   · Number of Possible Diagnoses and/or Management Options: moderate  · Amount and/or Complexity of Data to Be Reviewed: moderate  · Risk of Significant Complications, Morbidity, and/or Mortality: moderate    Overall Risk:

## 2018-05-09 NOTE — PATIENT INSTRUCTIONS
To reach Dr Monae Alonzo nurse during business hours, please call [phone]. After hours, including weekends, evenings, and holidays, please call the main number [phone] for emergent needs.

## 2018-05-09 NOTE — PROGRESS NOTES
Education Record    Learner:  Patient    Disease / Emilee Perry cancer    Barriers / Limitations:  None    Method:  Brief focused, printed material and  reinforcement    General Topics:  Plan of care reviewed    Outcome:  Shows understanding

## 2018-05-10 NOTE — PROGRESS NOTES
Education Record    Learner:  Patient    Disease / Ann Marie figueroa    Barriers / Limitations:  None   Comments:    Method:  Brief focused and Printed material   Comments:    General Topics:  Medication, Side effects and symptom management and Plan of car

## 2018-05-11 NOTE — PATIENT INSTRUCTIONS
On-body Injector for Neulasta Patient Instructions       Your On-Body Injection device was applied on this day:  5/11 at this time 9:00    Your dose of medication will start on this day: 5/12 at this time 12:00

## 2018-05-30 NOTE — PROGRESS NOTES
Cancer Center Progress Note  Patient Name: Shiraz Santoyo   YOB: 1935   Medical Record Number: [id number]   CSN: [id number]   Attending Physician: Andrey Dyer M.D.        Date of Visit: 5/30/2018    Chief Complaint:  No chief compla cerebrovascular disease 1/1/10   • Back problem    • Cancer [facility])     prostate   • Cancer ([facility]     lung   • Esophageal reflux    • Glaucoma     Has high pressure for last 25 years    • Gout    • Hearing impairment     [place] bilateral hearing aids   • OAB (o Tablet 12 Hr, Take 1,200 mg by mouth 2 (two) times daily. , Disp: , Rfl:   •  ALPRAZolam 0.25 MG Oral Tab, Take 1 tablet (0.25 mg total) by mouth every 6 (six) hours as needed for Anxiety. , Disp: 12 tablet, Rfl: 0  •  HYDROcodone-acetaminophen 5-325 MG Oral chills, night sweats, excessive fatigue or weight loss. Eyes No significant visual difficulties. No diplopia. No yellowing of the eyes. Hematologic/Lymphatic No easy bruising or bleeding. No any tender or palpable lymph nodes.    Respiratory No dyspnea of our discussions today.            Laboratory:    Recent Labs   05/30/18  0853   RBC 2.51 L   HGB 8.1 L   HCT 26.3 L   .8 H   MCH 32.3   MCHC 30.8 L   RDW 24.0 H   NEPRELIM 7.68 H   WBC 10.6   .0 L       Recent Labs   05/30/18  0853   GLU 11 adrenal on subsequent imaging. Will refer to Rad onc. Plan CT in 2 months. Thrombocytopenia chemo:  Improving    Anemia chemo: Stable    Reactive airway: Improved with a prednisone burst.  Following with pulmonology.     LE edema: Improved with lasix pr

## 2018-06-01 PROBLEM — T66.XXXS RADIATION EFFECT, SEQUELA: Status: ACTIVE | Noted: 2018-01-01

## 2018-06-01 NOTE — PATIENT INSTRUCTIONS
RADIATION INSTRUCTIONS:    - CT SIMULATION SCHEDULED FOR JUNE 4 (Monday) AT 9 AM AT [facility] (1ST FLOOR). - START NAMENDA (MEMANTINE) MEDICATION ON FIRST DAY OF TREATMENT.     - PLEASE COME IN 30 MINUTES BEFORE YOUR FIRST TREATMENT TO

## 2018-06-01 NOTE — PROGRESS NOTES
Nursing Consultation Note  Patient: Ashly Person  YOB: 1935  Age: 80year old  Radiation Oncologist: Dr. Ilene Iraheta  Referring Physician: Dennis Butler, Dr. Jewell Pavon (PCP), Dr. Fidel Corea   Diagnosis: Aline Craneavan oz)  05/07/18 : 89.8 kg (198 lb)       Review of Systems   Constitutional: Positive for chills. HENT: Positive for congestion. Eyes: Negative. Respiratory: Positive for cough and shortness of breath. Cardiovascular: Negative.     Gastrointestinal Take 1 tablet (10 mg total) by mouth every 6 (six) hours as needed for Nausea. Disp: 30 tablet Rfl: 3   Ondansetron HCl (ZOFRAN) 8 MG tablet Take 1 tablet (8 mg total) by mouth every 8 (eight) hours as needed for Nausea.  Disp: 30 tablet Rfl: 3   Nasal Mois Comment: Procedure: ESOPHAGOGASTRODUODENOSCOPY (EGD);                  Surgeon: Jesus Felix MD;  Location: [facility]                ENDOSCOPY  1/1/64: HERNIA SURGERY  No date: OTHER      Comment: Pilonidal cyst removed  1/1/00: OTHER SURGICAL HISTORY      C

## 2018-06-01 NOTE — ADDENDUM NOTE
Encounter addended by: Darius Benjamin MD on: 6/1/2018  2:37 PM<BR>    Actions taken: Order list changed, Diagnosis association updated

## 2018-06-04 NOTE — CONSULTS
[facility]    PATIENT'S NAME: Inga Shante   RADIATION ONCOLOGIST: Latia Sauceda M.D.    PATIENT ACCOUNT #: [de-identified] Leslie Hernandez   [facility]   MEDICAL RECORD #: [id number] YOB: 1935   CONSULTATION DATE: 06/01/2018       TYESHA activity. He also was noted to have a 3.7 x 3.2 cm abdominal aortic aneurysm. He underwent fine-needle aspiration of the thyroid to rule out malignancy.   This was negative for evidence of malignancy and was consistent with benign follicular nodule with Significant for hypertension; unspecified diabetes mellitus; sleep apnea; chronic shortness of breath; prostate cancer in 2000 status post prostatectomy; hyperlipidemia; overactive bladder; small cell cancer, extensive stage as above; hard of hearing with JARRED.  Oral cavity and oropharynx without exudates or lesions. NECK:  Without supraclavicular or cervical adenopathy. LUNGS:  Clear to auscultation bilaterally. HEART:  Regular rate and rhythm.   ABDOMEN:  Soft, nondistended, nontender, without hepatosp time, they would like to proceed. They will return next week for treatment planning and initiation of radiation therapy. Thank you for allowing us to participate in this very nice gentleman's care.     Dictated By Silvino Waite M.D.  d: 06/04/2018

## 2018-06-15 NOTE — PROGRESS NOTES
[facility] Radiation Treatment Management Note 1-5    Patient:  Hakan Chinchilla  Age:  80year old  Visit Diagnosis:    1. 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  Brain metastases ([facility]      Primary Rad/Onc:  Dr. Darline Calixto

## 2018-06-21 NOTE — PROGRESS NOTES
Nutrition Consultation    Patient Name: Peace Adamson  YOB: 1935  Medical Record Number: [id number]   Account Number: [de-identified]  Dietitian: Tolu Rojas RD    Date of visit: 6/21/2018    Diet Rx: high protein/calorie, soft as tolerat 1 tablet by mouth every 6 (six) hours as needed for Pain., Disp: 20 tablet, Rfl: 0  •  Acetaminophen ER (TYLENOL ARTHRITIS PAIN) 650 MG Oral Tab CR, Take 1,300 mg by mouth 2 (two) times daily. , Disp: , Rfl:   •  furosemide 20 MG Oral Tab, Take 1 tablet (20 denied c/o swallowing, appetite or other nutrition related side effects. Pt reported good appetite. Diet hx revealed a 2 balanced meal/d w/ HS snack of ice cream or other sweet. Both pt and spouse joked w/ each other throughout.  RD reviewed written materia

## 2018-06-25 NOTE — PROGRESS NOTES
[facility] Radiation Treatment Management Note 1-5    Patient:  Harsha Fuentes  Age:  80year old  Visit Diagnosis:    1. 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  B[facility] ([facility]      Primary Rad/Onc:  Dr. Chayito Badillo

## 2018-06-28 NOTE — PATIENT INSTRUCTIONS
POST-RADIATION INSTRUCTIONS:   - FOLLOW-UP WITH DR. Hina Jj ONLY AS NEEDED OR RECOMMENDED  - FOLLOW-UP WITH DR. Cici Patton ON JUL 12 AT 11 AM ON THE 2ND FLOOR OF THE CANCER CENTER IN [place]  - SIDE EFFECTS OF RADIATION WILL GRADUALLY SUBSIDE, IT MAY

## 2018-07-02 NOTE — PROGRESS NOTES
[facility] Radiation Treatment Management Note 11-15    Patient:  Juana Wiseman  Age:  80year old  Visit Diagnosis:    1.  Brain metastases ([address]      Primary Rad/Onc:  Dr. Ashley Ocampo    Site Delivered Dose (Gy) Prescrib

## 2018-07-12 NOTE — PROGRESS NOTES
Cancer Center Progress Note  Patient Name: Verlin Lundborg   YOB: 1935   Medical Record Number: [id number]   CSN: [id number]   Attending Physician: Katina Corona M.D.        Date of Visit: 7/12/2018      Chief Complaint:  Patient prese Diagnosis Date   • Acute, but ill-defined, cerebrovascular disease 1/1/10   • Arthritis    • Back problem    • Cancer [facility])     prostate   • Cancer ([facility]     lung   • Esophageal reflux    • Glaucoma     Has high pressure for last 25 years    • Gout    • Concern Yes    Exercise Yes     Social History Narrative    , lives with wife    Has 2 sons, lives around here    Uses cane for ambulatory assistance; uses hearing aides       Current Medications:    Current Outpatient Prescriptions:   •  LOSARTAN P Take 15 mg by mouth daily. , Disp: , Rfl:   •  Prochlorperazine Maleate (COMPAZINE) 10 mg tablet, Take 1 tablet (10 mg total) by mouth every 6 (six) hours as needed for Nausea., Disp: 30 tablet, Rfl: 3  •  Ondansetron HCl (ZOFRAN) 8 MG tablet, Take 1 tablet clear to auscultation, no wheezing. Cardiovascular Normal - Regular rate and rhythm, no murmurs. Abdomen Normal - Non-tender, non-distended, no masses, ascites or hepatosplenomegaly. Extremities Normal - No cyanosis, clubbing or edema.     Integument the family. Electronically Signed by: Ira Johnson M.D.   [facility]

## 2018-07-20 NOTE — PROGRESS NOTES
RADIATION ONCOLOGY TREATMENT SUMMARY         GREYSON/ Zeenat Antoine Location: [facility]   Date of Birth   3/9/1935 Radiation Oncologist     Jeremiah Bhardwaj MD, [facility]       RADIATION ONCOLOGY TREATMENT SUMMARY     PHYSICIAN: abdominal aortic aneurysm. He underwent fine-needle aspiration of the thyroid to rule out malignancy. This was negative for evidence of malignancy and was consistent with benign follicular nodule with degenerative changes.   As part of his staging ramos 16 Treatment Approved       Treatment Summary: Course: 1 L Lung + Brain    Treatment Site Energy Dose/Fx (Gy) #Fx Dose Correction (Gy) Total Dose (Gy) Start Date End Date Elapsed Days   L Lung 18X 2.5 16 / 16 0 40 6/12/2018 7/3/2018 21   Whole Brain 6X 2.5

## 2018-08-13 NOTE — TELEPHONE ENCOUNTER
Patient's wife c/o patient feeling tired all the time. She stated that the patient completed radiation and he has been sleeping most of the time. She stated that the patient probably need a blood transfusion. Liana MARCANO informed.  She was informed to

## 2018-08-14 NOTE — PROGRESS NOTES
Education Record    Learner:  Patient and Spouse    Disease / Diagnosis: hypotension    Barriers / Limitations:  None   Comments:    Method:  Brief focused, Discussion, Printed material and Reinforcement   Comments:    General Topics:  Medication, Side eff

## 2018-08-14 NOTE — PROGRESS NOTES
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 , Disp: , Rfl:   •  Azelastine HCl 0.1 % Nasal Solution, 1 spray by Nasal route 2 (two) times daily. , Disp: , Rfl:   •  Fluticasone Propionate 5 (14)   Collection Time: 08/14/18 10:57 AM   Result Value Ref Range   Glucose 115 (H) 70 - 99 mg/dL   Sodium 133 (L) 136 - 144 mmol/L   Potassium 4.5 3.6 - 5.1 mmol/L   Chloride 98 (L) 101 - 111 mmol/L   CO2 23.0 22.0 - 32.0 mmol/L   Anion Gap 12 0 - 18 mmo and scheduled in October 2. Anemia: slowly improving, may be dilutional from dehydration   3. Hypotension: will hold Losartin and discuss with PCP  4. Dehydration: 500 ml NS, recheck BP if improved may go home and push fluids.   5. Fatigue from treatment:

## 2018-08-15 NOTE — TELEPHONE ENCOUNTER
Spoke with both pt and wife, pt stopped the Losartan and did take the Amlodipine today. He is reporting that he is still feeling weak. They are concerned about taking the half of Losartan. Advised them will route this message to YP for re-review.      Did

## 2018-08-15 NOTE — TELEPHONE ENCOUNTER
Dr. Di Thayer review/recommendations:  Stop amlodipine and decrease losartan to 1/2 tablet. Follow up in 1 week. Do you wish Dr. Di Thayer Triage Nurse contact pt? Thank you.

## 2018-08-15 NOTE — TELEPHONE ENCOUNTER
----- Message from KRYS Nava sent at 8/14/2018 11:50 AM CDT -----  Dr Kyrie Sarah saw Juventino Gibson in the office today, he complains of fatigue, his BP in the office was 89/53. He was a bit dry and will give IVF, he is on Losartan 100 mg and Amlodipine 5 mg.  Wo

## 2018-08-17 NOTE — TELEPHONE ENCOUNTER
Pt wife Naida Carter informed  Understanding verbalized Anitastates she will call back and let us know what his BP's have been.

## 2018-08-21 NOTE — PROGRESS NOTES
HPI:    Patient ID: Harsha Fuentes is a 80year old male. Pt's previous labs show that he has elevated MCV. Was on amlodipine and losartan/hctz. However stopped them both within the last week.   He stopped them due to having hypotension and needin daily before meals. Disp:  Rfl:    Azelastine HCl 0.1 % Nasal Solution 1 spray by Nasal route 2 (two) times daily. Disp:  Rfl:    Fluticasone Propionate 50 MCG/ACT Nasal Suspension 1 spray by Each Nare route daily.  Disp:  Rfl:    simvastatin 10 MG Oral Tab CBC, CMP, b12    2. Elevated MCV  As above. 3. Primary osteoarthritis involving multiple joints  - HYDROcodone-acetaminophen (NORCO) 5-325 MG Oral Tab; Take 1 tablet by mouth every 6 (six) hours as needed for Pain. Dispense: 50 tablet; Refill: 0    4.

## 2018-09-04 NOTE — PATIENT INSTRUCTIONS
Get A1C, PSA, and test ordered by your oncologist, next time you go for blood test.  Walk more. Consider physical therapy for your back and balance if those get worse or fail to improve with walking and accupunture.

## 2018-09-04 NOTE — PROGRESS NOTES
HPI:   Dell Livingston is a 80year old male who presents for a Medicare Subsequent Annual Wellness visit (Pt already had Initial Annual Wellness).   Annual Physical due on 09/06/2018        Fall/Risk Assessment Update needed    Last Fall risk screen was Vision Problems? : Yes   He has Walking problems based on screening of functional status. Difficulty walking?: Yes   He has problems with Daily Activities based on screening of functional status.    Problems with daily activities? : Yes          Depress unspecified(787.20)     Esophageal reflux     Chronic rhinitis     Shortness of breath     Malignant neoplasm of prostate (HCC)     Obesity, unspecified     Abdominal aneurysm without mention of rupture     Diabetes ([facility]     Essential hypertension     Ref  (H) 08/14/2018        CBC  (most recent labs)   Lab Results   Component Value Date    WBC 6.3 08/14/2018    HGB 10.5 (L) 08/14/2018    .0 (L) 08/14/2018        ALLERGIES:   He has No Known Allergies.     CURRENT MEDICATIONS:     Outpatient Esophageal reflux, Glaucoma, Gout, Hearing impairment, Lung cancer (HCC), OAB (overactive bladder), Other and unspecified hyperlipidemia, Personal history of malignant neoplasm of prostate, [facility] ([facility]), Shortness of breath, Sleep apnea, Ty anxiety  HEMATOLOGIC: denies hx of anemia  ENDOCRINE: denies thyroid history  ALL/ASTHMA: denies hx of allergy or asthma  EXAM:   /60   Pulse 75   Temp 98.4 °F (36.9 °C) (Oral)   Resp 20   Ht 64\"   Wt 189 lb   SpO2 98%   BMI 32.44 kg/m²   Estimated Bowel sounds are normal. He exhibits no distension and no mass. There is no hepatosplenomegaly. There is no tenderness. There is no rebound and no guarding. No hernia. Musculoskeletal: Normal range of motion.    Lymphadenopathy:     He has no cervical teresa patient  PREVENTATIVE SERVICES  INDICATIONS AND SCHEDULE Internal Lab or Procedure External Lab or Procedure   Diabetes Screening      HbgA1C   Annually HEMOGLOBIN A1c (% of total Hgb)   Date Value   12/20/2011 5.7 (H)     HGBA1C (%)   Date Value   04/28/2 No vaccine history found Medium/high risk factors:   End-stage renal disease   Hemophiliacs who received Factor VIII or IX concentrates   Clients of institutions for the mentally retarded   Persons who live in the same house as a HepB virus carrier   [facility] Data entered on: 9/28/2017   Last Dilated Eye Exam 9/28/2017     No flowsheet data found.

## 2018-09-26 NOTE — TELEPHONE ENCOUNTER
Last Refill- 3/12/18    Last Refill- Omeprazole 20 mg Shows unknown date      Last Visit-9/4/18          Please approve or deny medication

## 2018-10-17 NOTE — PROGRESS NOTES
Cancer Center Progress Note  Patient Name: Shiraz Santoyo   YOB: 1935   Medical Record Number: [id number]   CSN: [id number]   Attending Physician: Andrey Dyer M.D.        Date of Visit: 10/17/2018    Chief Complaint:  Patient presen CT C/A/P reveals progression of disease, as does hie MRI brain. He is sleeping 2/3 of the day.      Performance Status:  ECOG 2    Past Medical History:  Past Medical History:   Diagnosis Date   • Acute, but ill-defined, cerebrovascular disease 1/1/10   • OTHER      Pilonidal cyst removed   • OTHER SURGICAL HISTORY  1/1/00    prostate surgery, cancer removed   • TOTAL HIP REPLACEMENT Bilateral    • TRANSFORAMINAL LUMBAR EPIDURAL STEROID INJECTION MULTIPLE LEVEL Bilateral 3/5/2015    Performed by Dion Person Narrative      , lives with wife      Has 2 sons, lives around here      Uses cane for ambulatory assistance; uses hearing aides      Current Medications:    Current Outpatient Medications:   •  multiple vitamin Oral Chew Tab, Chew 1 tablet by mouth Systems:    Constitutional No fevers, chills, night sweats,  or weight loss. Eyes No significant visual difficulties. No diplopia. No yellowing. Hematologic/Lymphatic Normal - No easy bruising or bleeding. No tender or palpable lymph nodes.    Respirat today.          Laboratory:  Recent Labs      10/17/18   1238   RBC  3.59 L   HGB  11.8 L   HCT  37.5   MCV  104.5 H   MCH  32.9   MCHC  31.5   RDW  15.4   NEPRELIM  6.14   WBC  7.9   PLT  170.0     Recent Labs      10/17/18   1238   GLU  137 H   BUN  14 cycles of Carbo/VP16 and radiation. He also completed WBRT. Unfortunately, he has now progressed. He is not a candidate for systemic chemotherpay, but Immunotherapy with Nivolumab has now been approved for use in relapsed Small cell lung cancer.   We rev

## 2018-10-22 NOTE — PROGRESS NOTES
IV Immunotherapy Education    Patient: Leola Seen  Date:  10/22/18   Diagnosis: Lung cancer  Caregivers present: Spouse     Drug names: Nivolumab     Treatment Effects on Bone Marrow:  Chemotherapy action on cancer / normal cells}  Function of white b

## 2018-10-22 NOTE — TELEPHONE ENCOUNTER
Pt needs refill of [place]  Pt requesting it for 1 month because he I sgoing to [place] for 1 month on Friday  Please call when ready

## 2018-10-23 NOTE — TELEPHONE ENCOUNTER
Pt's wife stated pt is in pain and she is requesting to please ask doc Morena Franz if he can please expedite the approval by today.   pt's wife was explained that we do not do same day refills unless we are not that busy and that doc needs at least 42 hours for a

## 2018-10-23 NOTE — PROGRESS NOTES
Pt here for C1D1 of Opdivo.   Arrives Ambulating independently, accompanied by Spouse           Modifications in dose or schedule: No     Frequency of blood return and site check throughout administration: Prior to administration   Discharged to Home, Ambul

## 2018-10-24 PROBLEM — Z51.5 PALLIATIVE CARE BY SPECIALIST: Status: ACTIVE | Noted: 2018-01-01

## 2018-10-24 PROBLEM — Z71.89 GOALS OF CARE, COUNSELING/DISCUSSION: Status: ACTIVE | Noted: 2018-01-01

## 2018-10-24 NOTE — PROGRESS NOTES
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        Impression/Plan:   Pain  1.  norco 5mg BID  2. Can use acetaminophen BID in between norco doses    Constipation  1.  miralax daily    Palliative   1.   DNR/DNI      Planned Follow up:

## 2018-10-25 NOTE — TELEPHONE ENCOUNTER
Date of Treatment:  10/23/18                              Type of Immunotherapy: Opdivo    Comments: Spoke with patients wife. Pt did seem confused about taking a nap earlier today. He denies HA, no neuro or speech deficits.  Voice is a little more hoarse t

## 2018-11-08 NOTE — TELEPHONE ENCOUNTER
Pt is out of town.   He needs a refill on Norco.   Please mail script to pt's son [address] at [address], [place], [address]

## 2018-11-09 NOTE — TELEPHONE ENCOUNTER
Left a message for patient to call. He is requesting a norco refill but it is too early. He will be back in town at the end of the month.

## 2018-11-15 NOTE — TELEPHONE ENCOUNTER
Wife requesting script refill for norco.  They are currently in [place] but coming home early because he is in pain and is getting low on norco.  They were supposed to stay until after Thanksgiving but will be home this week.     He is using 2X daily with b

## 2018-11-20 NOTE — TELEPHONE ENCOUNTER
Patient spouse here for norco script. Script writt[place] last [place]. Patient is having increased pain and she feels his QOL has diminished as a result of pain and chemotherapy.   They will discuss with RIGO

## 2018-11-26 NOTE — TELEPHONE ENCOUNTER
Phone call from daughter who states patient has been admitted to [facility] after falls. He has pneumonia. He has been getting weaker and having more pain. They have decided not to pursue immunotherapy anymore.   She is calling to get info about hospice

## 2018-11-26 NOTE — TELEPHONE ENCOUNTER
Call received from spouse stating that pt has falling twice this morning. First time pt fell, spouse called paramedics who came and helped pt back in to bed; pt refused to go to ED. Pt then fell a second time and is now on the floor sleeping.  Spouse states

## 2018-11-28 NOTE — TELEPHONE ENCOUNTER
Wife called stating patient will be discharged from [facility] tomorrow. She is worried about being able to care for him at home. She doesn't want an ECF. She has an appointment next week with Dr. Reta Sepulveda but doesn't think she can wait that long.

## 2018-11-30 NOTE — TELEPHONE ENCOUNTER
Per YP: able to follow pt for anything not under Hospice. Santy Padilla, she expressed understanding and agreement. Task completed.

## 2018-12-05 ENCOUNTER — APPOINTMENT (OUTPATIENT)
Dept: HEMATOLOGY/ONCOLOGY | Age: 83
End: 2018-12-05
Attending: INTERNAL MEDICINE
Payer: MEDICARE

## 2020-03-31 NOTE — PROGRESS NOTES
Education Record    Learner:  Patient    Disease / Diagnosis:    Barriers / Limitations:  None   Comments:    Method:  Reinforcement   Comments:    General Topics:  Side effects and symptom management and Plan of care reviewed   Comments:    Outcome:  Show 99

## 2020-09-21 NOTE — CONSULTS
Patient is being referred for right knee pain, last images were done 8/20.  Please advise if we need additional images [facility]    PATIENT'S NAME: Mariah Harmon   ATTENDING PHYSICIAN: Raiza Tierney D.O.   CONSULTING PHYSICIAN: La Green M.D.    PATIENT ACCOUNT#:   [de-identified]    [facility] [facility]  MEDICAL RECORD #:   [id number]       DATE OF B organ system review is negative. PHYSICAL EXAMINATION:    GENERAL:  Healthy-appearing male in no acute distress. VITAL SIGNS:  Blood pressure is 130 to 160 over 70s, pulse is 90, he is afebrile. HEENT:  Normocephalic. Anicteric sclerae.   NECK:  Justin

## 2023-01-11 NOTE — TELEPHONE ENCOUNTER
Nita Fuentes from [facility] called stated pt denied occ therapy evaluation. Please call Nita Fuentes with any questions regarding this message.
Vipin Geiger from [facility] health called stated pt denied occ therapy evaluation. Please call Vipin Geiger with any questions regarding this message.     JG- see above-fyi
Take over the counter pain medication

## 2024-02-15 NOTE — PROGRESS NOTES
Education Record    Learner:  Patient    Disease / Diagnosis:sclc    Barriers / Limitations:  None    Method:  Brief focused, printed material and  reinforcement    General Topics:  Plan of care reviewed    Outcome:  Shows understanding
[FreeTextEntry1] : Supportive measures for upper respiratory infection were discussed. Such measures include use of nasal saline and suction as needed to clear the nasal passages, increasing fluids, hot showers or steam from the bathroom, propping the child up on a second pillow (for children > 1year old), use of an OTC home remedy such as vapo rub for comfort and giving 1 tablespoon of honey an hour before bedtime for cough.  Tylenol can be used every 4 hours as needed for fever or pain and Motrin can be used every 6 hours as needed for fever or pain.  If child has a fever of 100.4 or more or symptoms are worsening at any time, return for recheck or seek other medical attention.
PRINCIPAL DISCHARGE DIAGNOSIS  Diagnosis: Bimalleolar fracture  Assessment and Plan of Treatment:

## 2024-09-18 NOTE — LETTER
ASSESSMENT & PLAN       Today we discussed the underlying etiology/pathology of patient's   1. Right hand weakness    2. History of TIA (transient ischemic attack) and stroke, numerous with last in 2015 affecting right side    3. Type 2 diabetes mellitus with diabetic polyneuropathy, with long-term current use of insulin (H)    4. Paroxysmal atrial fibrillation (H)      Discussed diagnosis and treatment options with the patient today. A shared decision making model was used. The patient's values and choices were respected. The following represents what was discussed and decided upon by the provider and the patient.   -We discussed the patient is voicing acute onset of right hand weakness affecting daily activities including writing.  She states symptoms began around 07/26/2024 without any known particular injury.  We discussed recent past medical history including surgery at [place] for tumor removal of the abdomen/pelvis region and patient was in a right lateral decubitus position for that procedure which may have contributed to some type of compression syndrome of the right upper extremity.  - We discussed patient also has a history of numerous mini strokes/TIAs with the last one documented in 2015 which affected her right upper extremity.  Patient states that she never had post TIA rehab program for the right upper extremity.  Patient also has a history of cardiac issues including A-fib and patient is trying to get a Watchman device implanted.  - We discussed the patient has no pain symptoms of the right upper extremity.  Physical exam does not show a clear etiology other than the patient voicing more discomfort involving the fifth and fourth digits of the right hand but also cannot rule out the third digit being involved.  She denies any symptoms really proximal to Guyon's canal of the wrist.  Patient has slight weakness of the right hand intrinsic muscles as well as weakness of the wrist flexor and extensor  [facility] [address], [address] [place] [address]    Consent for Anesthesia   1.    Genie Osler agree to be cared for by an anesthesiologist, who is specially trained to monitor me and give me medicine to put me to sleep or keep me comfor "musculature of unknown duration.  - At this time we will send the patient for upper extremity EMG testing on the right side to help give a better clinical picture about possible chronic changes related to possible history of TIAs as well as possible acute changes that began in July 2024.  - We discussed the patient again has no pain issues as well as full range of motion of the shoulders elbow and wrist otherwise and does not require any medication management, immobilization or rehab at this time.  -I will contact the patient when EMG/nerve conduction study results are known and we will update treatment plan at that time.     -Call direct clinic number [[phone]] at any time with questions or concerns in regards to your recent office visit with me.     Jay Jones PA-C  [facility]    This note was completed in part using a voice recognition software, any grammatical or context distortion are unintentional and inherent to the software.         SUBJECTIVE  Kemi Soto is a/an 75 year old female who is seen in consultation at the request of  Zaheer Sandra M.D. for evaluation of right hand weakness the patient is seen by themselves.    Onset: 2 month(s) ago. Reports insidious onset.  Reports happened after pelvic mass surgery July 21st.  Patient was in a lateral decubitus position self-reported for her procedure.  Patient did not recall any particular abnormality until least 7 to 10 days after returning home from her [facility] pelvic procedure.  Location of Pain: (numbness/tingling) right digits 4-5, maybe the 3rd  Rating of Pain at worst: 0/10  Rating of Pain Currently: 0/10  Worsened by: nothing specific  Better with: nothing   Treatments tried: no treatment tried to date  Quality: numb  Associated symptoms: numbness and tingling of digits 4-5, weakness of right hand.   Orthopedic history: NO,  left elbow injury though.   2015- right \"mini-stroke\"   Relevant surgical history: " vision, nerves, or muscles and in extremely rare instances death. 5. My doctor has explained to me other choices available to me for my care (alternatives).   6. Pregnant Patients (“epidural”):  I understand that the risks of having an epidural (medicine g NO  Social history: social history: retired, uses cane in right hand on occasion.     Past Medical History:   Diagnosis Date    Adrenal nodule (H24)     Alcohol dependence in remission (H)     Anemia     Antiplatelet or antithrombotic long-term use     Anxiety and depression     Arrhythmia     Benign essential hypertension     Chronic atrial fibrillation (H)     Chronic back pain     Chronic infection     Coronary artery disease     Diabetes mellitus (H)     Difficulty walking     Discitis of thoracolumbar region 10/10/2015    Encephalopathy 10/14/2015    Epidural abscess 10/10/2015    Hip pain, right     History of angina     Hyperlipidemia     Irregular heart beat     Other chronic pain     Ovarian mass     Sepsis (H) 10/08/2015    Stented coronary artery     Stroke (H) 2015    Neurology felt that episode was C/W subacute ischemic stroke    TIA (transient ischemic attack) 2015    UTI (urinary tract infection)     admitted to [facility] with UTI    Walking troubles      Social History     Socioeconomic History    Marital status:     Number of children: 1   Occupational History    Occupation: retired   Tobacco Use    Smoking status: Former     Current packs/day: 0.00     Types: Cigarettes     Quit date: 2007     Years since quittin.2     Passive exposure: Past    Smokeless tobacco: Never   Vaping Use    Vaping status: Never Used   Substance and Sexual Activity    Alcohol use: Not Currently     Comment: Stopped drinking 2015    Drug use: No    Sexual activity: Never   Social History Narrative    Lives alone with cats and dogs. , one daughter, Tory Lofton.      Social Determinants of Health     Financial Resource Strain: Low Risk  (2024)    Financial Resource Strain     Within the past 12 months, have you or your family members you live with been unable to get utilities (heat, electricity) when it was really needed?: No   Food Insecurity: No Food Insecurity  (7/18/2024)    Received from [facility]    Hunger Vital Sign     Worried About Running Out of Food in the Last Year: Never true     Ran Out of Food in the Last Year: Never true   Transportation Needs: No Transportation Needs (7/18/2024)    Received from [facility]    PRAPARE - Transportation     Lack of Transportation (Medical): No     Lack of Transportation (Non-Medical): No   Physical Activity: Inactive (5/8/2024)    Received from [facility]    Exercise Vital Sign     Days of Exercise per Week: 0 days     Minutes of Exercise per Session: 0 min   Interpersonal Safety: Not At Risk (7/18/2024)    Received from [facility]    Humiliation, Afraid, Rape, and Kick questionnaire     Fear of Current or Ex-Partner: No     Emotionally Abused: No     Physically Abused: No     Sexually Abused: No   Housing Stability: Low Risk  (7/18/2024)    Received from [facility]    Housing Stability     What is your living situation today?: I have a steady place to live         Patient's past medical, surgical, social, and family histories were personally reviewed today and no changes are noted.    REVIEW OF SYSTEMS:  10 point ROS is negative other than symptoms noted above in HPI, Past Medical History or as stated below  Constitutional: NEGATIVE for fever, chills, change in weight  Skin: NEGATIVE for worrisome rashes, moles or lesions  GI/: NEGATIVE for bowel or bladder changes  Neuro: NEGATIVE for weakness, dizziness or paresthesias    OBJECTIVE:  Vital signs as noted in EPIC for 9/18/2024  General: healthy, alert and in no distress  HEENT: no scleral icterus or conjunctival erythema  Skin: no suspicious lesions or rash. No jaundice.  CV: no pedal edema  Resp: normal respiratory effort without conversational dyspnea   Psych: normal mood and affect  Gait: normal steady gait with appropriate coordination and balance  Neuro: Normal light sensory exam of lower extremity      MSK:  Exam shows a pleasant 75-year-old female who presents  full weightbearing without assistive device.  She is alert and orientated x 3.  Examination of the right upper extremity compared to the left shows no obvious bruising, swelling or ecchymosis.  No william atrophy.  Patient has full range of motion of the elbows without limitation.  No particular abnormality or presentation of symptoms with percussion of the cubital tunnel on the right elbow.  Prolonged flexion test does not reproduce symptoms.  Patient does have some slight arthritic deformity of the fifth pinky digit and has a slight flexion positioning of the fourth and fifth digits possibly indicating intrinsic muscle weakness.  Patient does have weakness of intrinsic muscles with opposition testing of the thumb to the index finger.  No thenar or hypothenar atrophy.  Patient can make a closed fist and open hand fully.  She does have some mild weakness on the right compared to the left with wrist flexion and extension against resistance.  Tinel and Phalen's at the wrist are negative.  Slightly decreased radial pulse on the right side compared to the left but adequate tissue perfusion distally.  Cap refill less than 2 seconds.  No abnormality of the nails.  Patient shows no tenderness to palpation throughout the entire length of the radius and the ulna.  No pain through the carpus of the wrist.  Motor strength appears to be normal with the biceps and the triceps.            Patient's conditions were thoroughly discussed during today's visit with total time reviewing patient's previous medical records/history/radiology, face-to-face examination and discussion and plan of care with the patient and documentation being 30 minutes for today's clinical visit  Jay Jones PA-C  [facility]    This note was completed in part using a voice recognition software, any grammatical or context distortion are unintentional and inherent to the software.

## 2025-07-07 NOTE — ANESTHESIA POSTPROCEDURE EVALUATION
[facility] Patient Status:  Hospital Outpatient Surgery   Age/Gender 80year old male MRN [id number]   Location [address] [place] [address]. Attending Wendie Gonzalez MD   Hosp Day # 0 PCP Ruben Schmitt DO       Anesthesia Post-o 120

## (undated) DEVICE — BOWLS UTILITY 16OZ

## (undated) DEVICE — NEEDLE SPNL 8GA BVL TIP

## (undated) DEVICE — Device: Brand: DEFENDO AIR/WATER/SUCTION AND BIOPSY VALVE

## (undated) DEVICE — TRANSPOSAL ULTRAFLEX DUO/QUAD ULTRA CART MANIFOLD

## (undated) DEVICE — 3.0MM PRECISION NEURO (MATCH HEAD)

## (undated) DEVICE — FILTERLINE NASAL ADULT O2/CO2

## (undated) DEVICE — 3M™ RED DOT™ MONITORING ELECTRODE WITH FOAM TAPE AND STICKY GEL, 50/BAG, 20/CASE, 72/PLT 2570: Brand: RED DOT™

## (undated) DEVICE — KENDALL SCD EXPRESS SLEEVES, KNEE LENGTH, MEDIUM: Brand: KENDALL SCD

## (undated) DEVICE — GLOVE ORTHO ALOETOUCH SZ 7

## (undated) DEVICE — GLOVE SURG TRIUMPH SZ 7

## (undated) DEVICE — SNARE CAPTIFLEX MICRO-OVL OLY

## (undated) DEVICE — 3M™ STERI-DRAPE™ U-DRAPE 1015: Brand: STERI-DRAPE™

## (undated) DEVICE — C-ARMOR C-ARM EQUIPMENT COVERS CLEAR STERILE UNIVERSAL FIT 12 PER CASE: Brand: C-ARMOR

## (undated) DEVICE — FORCEP RADIAL JAW 4

## (undated) DEVICE — DRILL SRG OIL CRTDG MAESTRO

## (undated) DEVICE — MEDI-VAC NON-CONDUCTIVE SUCTION TUBING: Brand: CARDINAL HEALTH

## (undated) DEVICE — SUTURE MONOCRYL 3-0 PS-2

## (undated) DEVICE — SHEET,DRAPE,70X100,STERILE: Brand: MEDLINE

## (undated) DEVICE — TRAP 4 CPTR CHMBR N EZ INLN

## (undated) DEVICE — SOL  .9 1000ML BTL

## (undated) DEVICE — GLOVE SURG TRIUMPH SZ 9

## (undated) DEVICE — SINGLE USE BIOPSY VALVE MAJ-210: Brand: SINGLE USE BIOPSY VALVE (STERILE)

## (undated) DEVICE — KENDALL SCD EXPRESS SLEEVES, THIGH LENGTH, MEDIUM: Brand: KENDALL SCD

## (undated) DEVICE — SUTURE VICRYL 2-0 CP-2

## (undated) DEVICE — Device

## (undated) DEVICE — GLOVE BIOGEL M SURG SZ 9

## (undated) DEVICE — SPECIMEN TRAP LUKI

## (undated) DEVICE — SOLUTION ANSEP 70% ISOPRPNL

## (undated) DEVICE — COVER,TABLE,HVY DUTY,EXT,77"X96",STRL: Brand: MEDLINE

## (undated) DEVICE — AIRLIFE&#8482 MISTY MAX 10 NEBULIZER W 7 (2.1 M) CRUSH RESISTANT OXYGEN TUBING BAFFLED TEE ADAPTER, MOUTHPIECE: Brand: AIRLIFE

## (undated) DEVICE — 1200CC GUARDIAN II: Brand: GUARDIAN

## (undated) DEVICE — SYRINGE 10ML SLIP TIP

## (undated) DEVICE — FLOSEAL SEALENT STERILE 10ML

## (undated) DEVICE — LAPAROTOMY SPONGE - RF AND X-RAY DETECTABLE PRE-WASHED: Brand: SITUATE

## (undated) DEVICE — WRAP COOLING BACK W/NO PILLOW

## (undated) DEVICE — NEEDLE ASP VIZISHOT 19G 2MM

## (undated) DEVICE — MEDI-VAC SUCTION HANDLE REGULAR CAPACITY: Brand: CARDINAL HEALTH

## (undated) DEVICE — DRAPE C-ARM UNIVERSAL

## (undated) DEVICE — SINGLE USE SUCTION VALVE MAJ-209: Brand: SINGLE USE SUCTION VALVE (STERILE)

## (undated) DEVICE — ENDOSCOPY PACK UPPER: Brand: MEDLINE INDUSTRIES, INC.

## (undated) DEVICE — NVM5 PROBE SNGL USE STER PKG

## (undated) DEVICE — 3M(TM) TEGADERM(TM) TRANSPARENT FILM DRESSING FRAME STYLE 1628: Brand: 3M™ TEGADERM™

## (undated) DEVICE — MASK ISOLATION

## (undated) DEVICE — VIOLET BRAIDED (POLYGLACTIN 910), SYNTHETIC ABSORBABLE SUTURE: Brand: COATED VICRYL

## (undated) DEVICE — DRAPE TABLE COVER 44X90 TC-10

## (undated) DEVICE — SCALPEL .063IN KT PRFCT SRG

## (undated) DEVICE — 60 ML SYRINGE REGULAR TIP: Brand: MONOJECT

## (undated) DEVICE — TRAP SPECIMEN MUCOUS 70 CUBIC CENTIMETER POLYURETHANE STERILE NOT MADE WITH NATURAL RUBBER LATEX MEDICHOICE: Brand: MEDICHOICE

## (undated) DEVICE — NVM5 NEEDLE MODULE S

## (undated) DEVICE — NVM5 NEEDLE MODULE M/E

## (undated) DEVICE — EVEREST 1 LEVEL KIT 1.4MM STRL

## (undated) DEVICE — LIGHT HANDLE

## (undated) NOTE — MR AVS SNAPSHOT
[address]  [address] [id number] [phone]               Thank you for choosing us for your health care visit with Jolynn Son, .   We are glad to serve you and happy to provide you with this diego This list is accurate as of: 3/27/17 11:59 PM.  Always use your most recent med list.                ADVIL OR   Take  by mouth.            allopurinol 300 MG Tabs   TAKE ONE TABLET BY MOUTH ONCE DAILY   Commonly known as:  ZYLOPRIM           AmLODIPine Besy These medications were sent to [address] [facility], [address] [phone], [phone]  [facility], [address] [place] [address]     Phone:  [phone]    - Montelukast Sodium 10 MG Tabs            MyChart     Visit MyChart

## (undated) NOTE — IP AVS SNAPSHOT
[facility] [place] [address] [place], [address] ~ [phone]                Discharge Summary   4/11/2017    [address] [facility]           Admission Information        Provider Department    4/11/2017 Herbert Mcfarlane MD  3sw-A Morning Afternoon Evening As Needed    allopurinol 300 MG Tabs   Last time this was given:  300 mg on 4/14/2017  8:54 AM   Commonly known as:  ZYLOPRIM        Take 300 mg by mouth daily.                             AmLODIPine Besylate 5 MG Tabs   Last time Shayne Parks                           TYLENOL 8 HOUR OR   Notes to Patient:  DO NOT EXCEED 3 grams of tylenol in 24 hours. [address] contains tylenol        Take by mouth. Vitamin B-12 ER 2000 MCG Tbcr        Take  by mouth. ? Usually allowed after  2-3 weeks;  check with physician at first office visit. ? Do Not drive while taking narcotics or muscle relaxants. ? Adhere to sitting restrictions. Stairs  ?  Climb stairs as needed    Incision site care and dressing  Changes taking narcotics to prevent constipation; use laxatives such as Miralax or Milk of Magnesia as needed. ? An enema or suppository may be necessary if above measures do not work. No smoking  ? Smoking will inhibit healing. ?  Even one cigarette a day halima ? Sudden reappearance of pain that won’t go away with pain medication. ? New numbness or weakness to arms or legs. ? Difficulty urinating or having bowel movements  ? Headaches that worsen when standing and resolve when laying flat.     Go directly to the Immunization History as of 4/14/2017  Never Reviewed    INFLUENZA 10/10/2016, 9/29/2015, 10/1/2014      Recent Hematology Lab Results  (Last 3 results in the past 90 days)    WBC RBC Hemoglobin Hematocrit MCV MCH MCHC RDW Platelet MPV    (29/87/54)  13.0 ( - If you have concerns related to behavioral health issues or thoughts of harming yourself, contact [facility] and [facility] at [phone].     - If you don’t have insurance, Aden Wilson has partnered with [employer] Quinapril-Hydrochlorothiazide 20-12.5 MG Oral Tab         Use: Treat abnormal blood pressure (high or low), cardiac conditions; and/or abnormal heart rates/rhythms   Most common side effects: Dizziness or feeling lightheaded (especially with standing), he What to report to your healthcare team: Stomach upset, unresolved pain           GI Medications     omeprazole 20 MG Oral Capsule Delayed Release       Use:  Nausea/vomiting, acid reflux, low bowel motility, stomach pain   Most common side effects:  Depend

## (undated) NOTE — MR AVS SNAPSHOT
[address]  [address] [facility], [address] [address] [phone]               Thank you for choosing us for your health care visit with Jolynn Son, .   We are glad to serve you and happy to provide you with this diego Assoc Dx:  Preop examination [G42.538]           UA/M With Culture Reflex [E]    Complete by: Mar 13, 2017    Assoc Dx:  Preop examination [Z01.818]           XR CHEST PA + LAT CHEST (CPT=71020)    Complete by:   Mar 13, 2017 (Approximate)    Assoc Dx:  Sandor Bruno Instructions: To schedule an appointment for your radiology test please call [facility] Scheduling at [phone].          Referral Details     Referred By    Referred To    Jeremy Adam DO   2007 [address] Dyspnea on exertion          Instructions and Information about Your Health     None      Allergies as of Mar 13, 2017     No Known Allergies                Today's Vital Signs     BP Pulse Temp Height Weight BMI    158/70 mmHg 70 97.7 °F (36.5 °C) (Oral) Apply to groin area twice daily as needed x 2 weeks, then 1 week break before reusing it. Commonly known as:  KENALOG           Vitamin B-12 ER 2000 MCG Tbcr   Take  by mouth. VITAMIN B6 OR   Take  by mouth.                 Where to Get Your Med

## (undated) NOTE — IP AVS SNAPSHOT
Patient Demographics     Address Phone E-mail Address    [address] [place] [address] [phone] (Home) *Preferred* [email or web address]      Emergency Contact(s)     Name Relation Home Work [address] [place] [address] Road [phone]      Ara Mcfarland ? Watch incision for any redness, drainage, increased warmth or opening of the incision. Call surgeon if you notice any of these.  ? No lotions or ointments on or near incision. ?  Steri-strips may be under dressing; these will gradually peel off  by the endorphins that are a natural body chemical that can decrease pain. Some examples of relaxation techniques are deep breathing, listening to music or meditating. ? Use Cold therapy (polar care) for 20-30 minutes multiple times per day.   Be sure there is a Follow-up Information     Follow up with Liz Edward DO. Schedule an appointment as soon as possible for a visit on 4/25/2017.     Specialties:  Family Practice, IP Consult to Primary Care    Why:  Follow up with Dr. Elizabeth Ceja on 4/25 at 2:30    Contac HYDROcodone-acetaminophen 5-325 MG Tabs   Last time this was given:  1 tablet on 4/13/2017  9:19 PM   Commonly known as:  NORCO        Take 1 tablet by mouth every 6 (six) hours as needed for Pain.     Alessandra GAITAN Order ID Medication Name Action Time Action Reason Comments    [id number] AmLODIPine Besylate (NORVASC) tab 5 mg 04/14/17 0854 Given      [id number] HYDROcodone-acetaminophen (NORCO)  MG per tab 1 tablet (Or Linked Group #2) 04/14/17 0948 Given      1 Temp 98.3 °F (36.8 °C) Filed at 04/14/2017 1148    SpO2 98 % Filed at 04/14/2017 1148      Patient's Most Recent Weight       Most Recent Value    Patient Weight 86.183 kg (190 lb)      Lab Results Last 24 Hours     No matching results found      Microbio Neck:  No tenderness to palpitation. Full range of motion to flexion and extension, lateral rotation   and lateral flexion of cervical spine. No JVD. Supple.    Back : symmetric, no prominence, non tender, no step off   Spine is balance in the sagittal an LUMBAR LAMINECTOMY POST LAT INTERBODY FUS 1 LEVEL  INTRAOPERATIVE NEURO MONITORING on (Not on file)  Patient Active Problem List:     Obstructive sleep apnea (adult) (pediatric)     Dysphagia, unspecified     Esophageal reflux     Chronic rhinitis     La Score well controlled with PCA. Denies N/V. No acute complaints at this time.      Past Medical History:  Past Medical History   Diagnosis Date   • Type II or unspecified type diabetes mellitus without mention of complication, not stated as uncontrolled    • Esop colchicine (COLCRYS) 0.6 MG Oral Tab Take 1 tablet (0.6 mg total) by mouth daily. Disp: 90 tablet Rfl: 1   triamcinolone acetonide 0.1 % External Cream Apply to groin area twice daily as needed x 2 weeks, then 1 week break before reusing it.  Disp: 60 g Rfl Psychiatric: Appropriate mood and affect.       Diagnostic Data:      Labs:  Recent Labs   Lab  04/11/17   1833   WBC  12.8   HGB  12.0*   MCV  95.6   PLT  173.0       No results for input(s): GLU, BUN, CREATSERUM, GFRAA, GFRNAA, CA, ALB, NA, K, CL, CO2, AL • Type II or unspecified type diabetes mellitus without mention of complication, not stated as uncontrolled    • Esophageal reflux    • Other and unspecified hyperlipidemia    • Unspecified essential hypertension    • Personal history of malignant neoplasm wheelchair, bedside commode, etc.): A Little   -   Moving from lying on back to sitting on the side of the bed?: A Lot   How much help from another person does the patient currently need. ..   -   Moving to and from a bed to a chair (including a wheelchair) limitations in mobility and functional independence. Pt's AM-PAC score demonstrating 57.70% functional impairment.     The patient is below his baseline and would benefit from skilled inpatient PT to address the above deficits to assist patient in returnin Room Number: 384/384-A     Session: 1   Number of Visits to Meet Established Goals: 4    Presenting Problem: s/p lumbar lami/fusion    Problem List  Active Problems:    S/P lumbar fusion    Benign essential HTN    Dyslipidemia    Gout    YANIRA (obstructive s Dynamic Sitting: Poor +           Static Standing: Poor +  Dynamic Standing: Poor +    ACTIVITY TOLERANCE  O2 Saturation: 95%  Room air    AM-PAC '6-Clicks' INPATIENT SHORT FORM - BASIC MOBILITY  How much difficulty does the patient currently have. ..  - Patient End of Session: Up in chair;Needs met;Call light within reach;RN aware of session/findings;SCDs in place; All patient questions and concerns addressed         ASSESSMENT      Patient is a 80year old male admitted 4/11/2017 for lumbar fusion .    In Therapist    Filed:  4/12/2017  2:05 PM Note Time:  4/12/2017  1:47 PM Status:  Addendum    :  Diamond Ann PT (Physical Therapist)      Related Notes: Original Note by Diamond Ann PT (Physical Therapist) filed at 4/12/2017  2:00 PM CARPAL TUNNEL RELEASE Right     OTHER      Comment Pilonidal cyst removed       HOME SITUATION  Type of Home: House   Home Layout: One level  Stairs to Enter : 3     Stairs to [employer]: 0       Lives With: Spouse  Drives: Yes  Patient Owned Equipment: Versa -   Moving to and from a bed to a chair (including a wheelchair)?: A Little   -   Need to walk in hospital room?: A Little   -   Climbing 3-5 steps with a railing?: A Lot       AM-PAC Score:  Raw Score: 14   PT Approx Degree of Impairment Score: 61.29%   S patient questions and concerns addressed;RN aware of session/findings;SCDs in place; Ice applied; Family present    ASSESSMENT     Patient is a 80year old male admitted 4/11/2017 for lumbar fusion .    In this PT evaluation, the patient presents with the fol Filed:  4/12/2017  2:00 PM Note Time:  4/12/2017  1:47 PM Status:  Signed    :  Rick Montilla PT (Physical Therapist)      Related Notes: Addendum by Rick Montilla PT (Physical Therapist) filed at 4/12/2017  2:05 PM             PHYSICAL TH OTHER      Comment Pilonidal cyst removed       HOME SITUATION  Type of Home: House   Home Layout: One level  Stairs to Enter : 3     Stairs to [employer]: 0       Lives With: Spouse  Drives: Yes  Patient Owned Equipment: Cane;Rolling walker  Patient Regular Author:  Riky George OT Service:  (none) Author Type:  Occupational Therapist    Filed:  4/13/2017  3:00 PM Note Time:  4/13/2017 12:45 PM Status:  Signed    :  Riky George OT (Occupational Therapist)           OCCUPATIONAL THERAPY T SUBJECTIVE  \"I'm not going to rehab. I'm stubborn. \"    Patient self-stated goal is to return home.     OBJECTIVE  Precautions: Spine;Lumbar brace    WEIGHT BEARING RESTRICTION  Weight Bearing Restriction: None                PAIN ASSESSMENT  Ratin  Lo bathroom via min assist x2 for safety and increased time > toilet transfer to standard height toilet (reports prefers this to 3-in-1 commode) via mod assist to control descent and heavy use of grab bar; discussed recommendation of RTS with arms or safety f indicating patient could benefit from rehab placement at discharge. Continue to recommend VIVEK at discharge. If patient unwilling or unable to discharge to [facility], will require 24-hour assist/supervision for safety and HHPT/OT.      OT Discharge Recommendations Problem List  Active Problems:    S/P lumbar fusion    Benign essential HTN    Dyslipidemia    Gout    YANIRA (obstructive sleep apnea)    Obesity (BMI 30-39. 9)    DM2 (diabetes mellitus, type 2) [facility])      Past Medical History  Past Medical History   Diagnos Precautions: Spine;Lumbar brace  Fall Risk: High fall risk    WEIGHT BEARING RESTRICTION  Weight Bearing Restriction: None                PAIN ASSESSMENT  Ratin  Location: back  Management Techniques: Activity promotion; Body mechanics;Breathing techniq into ADLs and functional transfers; patient required mod cueing to follow throughout session > education on logroll technique > attempted sitting EOB to L side via logroll, however patient unable to raise trunk off bed surface despite max cueing for techni decreased knowledge of adaptive techniques, decreased insight into deficits, decreased knowledge of spinal precautions and incorporation into ADLs.  These deficits manifest functionally while performing lower body dressing/bathing, toileting, toilet transfe No notes of this type exist for this encounter. SLP Notes     No notes of this type exist for this encounter.             Immunizations     Name Date      INFLUENZA 10/10/16     INFLUENZA 09/29/15     INFLUENZA 10/01/14       Future Appointments

## (undated) NOTE — Clinical Note
May 1, 2017    Georgette  43.      Dear Shandra Bellamy: It was a pleasure speaking with you over the phone recently.  To follow up, I wanted to send you my contact information to utilize when you have a question and or ne

## (undated) NOTE — MR AVS SNAPSHOT
[address]  [address] [address] [phone]               Thank you for choosing us for your health care visit with Tahir Deng DO.   We are glad to serve you and happy to provide you with this diego Commonly known as:  OPTIVAR           Azelastine HCl 0.1 % Soln   1 spray by Nasal route 2 (two) times daily. Commonly known as:  ASTELIN           colchicine 0.6 MG Tabs   Take 1 tablet (0.6 mg total) by mouth daily.    Commonly known as:  COLCRYS Summaries. If you've been to the Emergency Department or your doctor's office, you can view your past visit information in [employer] by going to Visits < Visit Summaries. [employer] questions? Call [phone] for help.   [employer] is NOT to be used for urge

## (undated) NOTE — IP AVS SNAPSHOT
[facility] [place] [address] [place], [address] ~ [phone]                Discharge Summary   2/21/2017    [address] [place] [address]           Admission Information        Provider Department    2/21/2017 Nancy Fuentes MD  Endoscopy [    ]    [    ]    [    ]    [    ]       Montelukast Sodium 10 MG Tabs   Commonly known as:  SINGULAIR        Take 1 tablet (10 mg total) by mouth daily.     Steve Ventura     [    ]    [    ]    [    ]    [    ]       omeprazole 20 MG Cpdr   Co tenderness or sharp severe abdominal pains, oral temperature over 100 degrees Fahrenheit, light-headedness or dizziness, or any other problems, contact your doctor.     Gastroscopy:  - You may have a sore throat for 2-3 days following the exam. This is norm 38.5 (01/26/17)  94.1 -- -- -- (01/26/17)  193.0 --      Recent Hematology Lab Results (cont.)  (Last 3 results in the past 90 days)    Neutrophil % Lymphocyte % Monocyte % Eosinophil % Basophil % Prelim Neut Abs Final Neut Abs Lymphocyte Abso Monocyte Abs We want to hear from you, please share your experience with us by returning the survey you will receive in the mail. Thank you!         MyChart     Visit [employer]  You can access your MyChart to more actively manage your health care and view more details f

## (undated) NOTE — LETTER
[facility] [address], [address]  Consent for Procedure/Sedation    Date:    Time:       1. I authorize the performance upon Urszula Needle the following:  VENOUS ACCESS PORT IMPLANT     2.  I authorize Dr. Jakub Gottlieb (and whome ________________________________    ___________________    Witness: _________________________      Date: ___________________    Printed: 2017   3:50 PM  Patient Name: Darianlex Honey        : 3/9/1935       Medical Record #: [id number]

## (undated) NOTE — IP AVS SNAPSHOT
[address]            (For Outpatient Use Only) Initial Admit Date: 2/19/2018   Inpt/Obs Admit Date: Inpt: 2/19/18 / Obs: 02/19/18   Discharge Date:    Hospital Acct:  [de-identified]   MRN: [de-identified]   CSN: [id number]        Ulysses Cole Subscriber ID:  Pt Rel to Subscriber:    Hospital Account Financial Class: Medicare    February 20, 2018

## (undated) NOTE — MR AVS SNAPSHOT
[address]  [address] [facility], [address] [address] [phone]               Thank you for choosing us for your health care visit with Honey Neighbors, DO.   We are glad to serve you and happy to provide you with this diego Instructions and Information about Your Health      Gout Diet  Gout is a painful condition caused by an excess of uric acid, a waste product made by the body. Uric acid forms crystals that collect in the joints.  The immune response to these crystals brings · Dairy products that are low-fat or fat-free, such as cheese and yogurt  · Complex carbohydrate foods, including whole grains, brown rice, oats, and beans  · Coffee, in moderation  · Water, approximately 64 ounces per day  Follow-up care  Follow up with mendy Test one time daily   Commonly known as:  FREESTYLE LITE TEST           HYDROcodone-acetaminophen 5-325 MG Tabs   Take 1 tablet by mouth every 6 (six) hours as needed for Pain.    Commonly known as:  NORCO           Losartan Potassium-HCTZ 100-12.5 MG Tabs discharge instructions in [employer] by going to Visits < Admission Summaries. If you've been to the Emergency Department or your doctor's office, you can view your past visit information in [employer] by going to Visits < Visit Summaries. [employer] questions? ? Use a cane or walker (indoors and out) if you are unsteady on your feet.              Visit [facility] online at  [email or web address]

## (undated) NOTE — LETTER
Printed: 2018    Patient Name: Anita Sheets  : 3/9/1935   Medical Record #: [id number]    Consent to Chemotherapy    I, Anita Sheets, understand that I have been diagnosed with small cell lung cancer.      I understand that the treatment s

## (undated) NOTE — MR AVS SNAPSHOT
[address]  [address] [address] [phone]               Thank you for choosing us for your health care visit with Kyle Mullen DO.   We are glad to serve you and happy to provide you with this diego Take 81 mg by mouth daily. Azelastine HCl 0.05 % Soln   Place 1 drop into both eyes 2 (two) times daily.    What changed:    - when to take this  - reasons to take this   Commonly known as:  OPTIVAR           Azelastine HCl 0.1 % Soln   1 spray by medications prescribed for you. Read the directions carefully, and ask your doctor or other care provider to review them with you.          Where to Get Your Medications      These medications were sent to [address], [address] You can access your MyChart to more actively manage your health care and view more details from this visit by going to [email or web address].   If you've recently had a stay at the Hospital you can access your discharge instructions in [address] by viola

## (undated) NOTE — LETTER
[facility]  [address] Baltazar [address]    Consent for Operation    Date: __________________    Time: _______________    1.  I authorize the performance upon Cal George the following operation:    Procedure(s):  ESOPHAGOGASTRODUOD procedure has been videotaped, the surgeon will obtain the original videotape. The hospital will not be responsible for storage or maintenance of this tape.     6. For the purpose of advancing medical education, I consent to the admittance of observers to t STATEMENTS REQUIRING INSERTION OR COMPLETION WERE FILLED IN.     Signature of Patient:   ___________________________    When the patient is a minor or mentally incompetent to give consent:  Signature of person authorized to consent for patient: ____________ drugs/illegal medications). Failure to inform my anesthesiologist about these medicines may increase my risk of anesthetic complications. · If I am allergic to anything or have had a reaction to anesthesia before.     3. I understand how the anesthesia med I have discussed the procedure and information above with the patient (or patient’s representative) and answered their questions. The patient or their representative has agreed to have anesthesia services.     _______________________________________________

## (undated) NOTE — LETTER
[facility] Testing Department  Phone: [phone]  OUTSIDE TESTING RESULT REQUEST      TO:   Dr. Elizabeth Ceja / Dary Andrews Date: 3/9/17    FAX #: [phone]     IMPORTANT: FOR YOUR IMMEDIATE ATTENTION  Please FAX all test results listed below

## (undated) NOTE — LETTER
Consent to Procedure/Sedation    Date: __________________    Time: _______________    1. I authorize the performance upon Rik Dandy the followin.  I authorize  ____Samreen_____ (and whomever is designated as the doctor’s assistant), t ___________________________    ___________________    Witness: ____________________     Date: ______________    Printed: 2018   8:27 AM    Patient Name: Anita Sheets        : 3/9/1935       Medical Record #: [id number]

## (undated) NOTE — MR AVS SNAPSHOT
[address]  [address] [facility], [place] [address] [phone]               Thank you for choosing us for your health care visit with Bev Hameed DO.   We are glad to serve you and happy to provide you with this diego What changed: You were already taking a medication with the same name, and this prescription was added. Make sure you understand how and when to take each.    Commonly known as:  NORVASC           Azelastine HCl 0.05 % Soln   Place 1 drop into both eyes 2 These medications were sent to [address] [facility], [address] [phone], [phone]  [address] [place], [place] [address]     Phone:  [phone]    - AmLODIPine Besylate 10 MG Tabs  - MetFORMIN HCl 500 MG Tabs  - omep

## (undated) NOTE — MR AVS SNAPSHOT
[address]  [address] [address] [phone]               Thank you for choosing us for your health care visit with Antonio Christiansen DO.   We are glad to serve you and happy to provide you with this diego C-PEPTIDE, SERUM OR PLASMA CAROTENE, TOTAL, SERUM CHOLESTEROL CMP COENZYME Q10, TOTAL CRYOFIBRINOGEN CRYOGLOBULIN QUALITATIVE W/RFX FASTING BLOOD SUGAR FATTY ACIDS, FREE GASTRIN LEVEL GROWTH HORMONE HDL INHIBIN-A (DIMER) LDL MELANOCYTE STIM.  HORMONE, BETA Commonly known as:  NIZORAL           MetFORMIN HCl 500 MG Tabs   Take 500 mg by mouth 2 (two) times daily with meals. Commonly known as:  GLUCOPHAGE           Montelukast Sodium 10 MG Tabs   Take 1 tablet (10 mg total) by mouth daily.    Commonly known a view more details from this visit by going to [email or web address]. If you've recently had a stay at the Hospital you can access your discharge instructions in [employer] by going to Visits < Admission Summaries.  If you've been to the Emergency Depar

## (undated) NOTE — LETTER
Printed: 10/22/2018    Patient Name: Daniel Lantigua  : 3/9/1935   Medical Record #: [id number]    Consent to [address], [address], understand that I have been diagnosed with Small cell lung cancer    I understand that the quentin contact my oncologist, Dr. Jia Chaudhari or my Cancer Care Team at any time if I have questions, by calling [facility] at [phone]. Additional written information will be given to me prior to start of therapy.     Additionally, I will receive a

## (undated) NOTE — Clinical Note
Pt refused to answer any TCM questions. States he's doing fine and will see Dr. Jia Chaudhari tomorrow am. Pt states he will call office back to schedule. TE sent to triage to f/u. Thank you!

## (undated) NOTE — LETTER
Last Revised 02/07/06  Obstructive Sleep Apnea Questionnaire    Clinical signs and symptoms suggesting the possibility of YANIRA    1. Predisposing physical characteristics (positive with any of the following present)  ? BMI 35kg/m²  ?  Craniofacial abnormalit pauses which are frightening to the observer, patient regularly falls asleep within minutes after being left unstimulated) in which case they should be treated as though they have severe sleep apnea.     The sleep laboratory’s assessment (none, mild, modera Point Total for B           C. Requirement for postoperative opioids.                Opioid requirement             Points   None 0    Low dose oral opiod 1    High dose oral opioids, parenteral or neuraxial opiods 3      Point Total for C        Estimation

## (undated) NOTE — LETTER
[facility] Testing Department  Phone: [phone]  Right Fax: [phone]  [facility] 20 By:  Sharad Nayak RN Date: 3/31/17    Patient Name: Marlin Palm  Surgery Date: 4/11/2017    CSN: [id number]  Medical Record: